# Patient Record
Sex: FEMALE | Race: WHITE
[De-identification: names, ages, dates, MRNs, and addresses within clinical notes are randomized per-mention and may not be internally consistent; named-entity substitution may affect disease eponyms.]

---

## 2017-06-14 ENCOUNTER — HOSPITAL ENCOUNTER (OUTPATIENT)
Dept: HOSPITAL 62 - RAD | Age: 57
End: 2017-06-14
Attending: INTERNAL MEDICINE
Payer: MEDICARE

## 2017-06-14 DIAGNOSIS — Z01.89: ICD-10-CM

## 2017-06-14 DIAGNOSIS — C50.012: Primary | ICD-10-CM

## 2017-06-14 PROCEDURE — 78472 GATED HEART PLANAR SINGLE: CPT

## 2017-06-14 PROCEDURE — A9560 TC99M LABELED RBC: HCPCS

## 2017-06-14 NOTE — RADIOLOGY REPORT (SQ)
EXAM DESCRIPTION:  NM MUGA REST



COMPLETED DATE/TIME:  6/14/2017 3:36 pm



REASON FOR STUDY:  BREAST CA (C50.012)ENCOUNTER FOR OTHER SPECIFIED SPECIAL EXAMINATIONS (Z01. C50.91
2  MALIGNANT NEOPLASM OF UNSPECIFIED SITE OF LEFT FEMAL Z01.89  ENCOUNTER FOR OTHER SPECIFIED SPECIAL
 EXAMINATIONS



COMPARISON:  None.



RADIONUCLIDE AND DOSE:  25 mCi of technetium 99 tagged red blood cells.

The route of agent administration: Intravenous



TECHNIQUE:  Following administration of the radionuclide, gated images of the heart are obtained in t
hree projections.  Left ventricular functional analysis performed.



LIMITATIONS:  None.



FINDINGS:   LEFT VENTRICULAR FUNCTION:

EJECTION FRACTION: 72%.

END-DIASTOLIC VOLUME: 52 mL.

END-SYSTOLIC VOLUME: 16 mL.

WALL MOTION: No focal wall motion abnormalities.

OTHER: No other significant finding.



IMPRESSION:  NORMAL CARDIAC MUGA STUDY.  NORMAL LEFT VENTRICULAR FUNCTION WITH VALUES AS ABOVE.



TECHNICAL DOCUMENTATION:  JOB ID:  [id number]

 2011 [employer]- All Rights Reserved

## 2017-06-16 ENCOUNTER — HOSPITAL ENCOUNTER (OUTPATIENT)
Dept: HOSPITAL 62 - RAD | Age: 57
End: 2017-06-16
Attending: INTERNAL MEDICINE
Payer: MEDICARE

## 2017-06-16 DIAGNOSIS — C50.912: Primary | ICD-10-CM

## 2017-06-16 DIAGNOSIS — Z01.89: ICD-10-CM

## 2017-06-16 PROCEDURE — A9561 TC99M OXIDRONATE: HCPCS

## 2017-06-16 PROCEDURE — 78306 BONE IMAGING WHOLE BODY: CPT

## 2017-06-16 NOTE — RADIOLOGY REPORT (SQ)
EXAM DESCRIPTION:  NM WHOLE BODY BONE SCAN



COMPLETED DATE/TIME:  6/16/2017 12:57 pm



REASON FOR STUDY:  BREAST CA C50.912  MALIGNANT NEOPLASM OF UNSPECIFIED SITE OF LEFT FEMAL



COMPARISON:  CT angio chest 2/8/2015

CT abdomen pelvis 10/29/2012



RADIONUCLIDE AND DOSE:  20.3 millicuries Tc99m MDP.

The route of agent administration: Intravenous.



ADDITIONAL DRUGS AND DOSES:  None.



TECHNIQUE:  Routine delayed images at 3 hours post radionuclide injection acquired of the bony skelet
on including anterior and posterior whole-body projections and additional focused images as needed.



LIMITATIONS:  None.



FINDINGS:  BONES: There is increased uptake over the right medial malleolus, and along the right and 
left lateral malleoli cava likely related to arthritis.

Mild convex rightward thoracic curvature.  Increased uptake at the left L4-5 facet joint likely from 
arthritis

No increased uptake over the bony structures worrisome for metastatic disease.

KIDNEYS: Symmetric excretion without obstruction.

OTHER: No other significant finding.



IMPRESSION:  Increased uptake at both ankles likely from arthritis.

Increased uptake at the left L4-5 facet joint likely from arthritis

No increased uptake over the bony structures worrisome for metastatic disease.



COMMENT:  PQRS [id number]:  Current bone scan is compared with any available plain radiographs, prior bone
 scans, and CT/MRI.



TECHNICAL DOCUMENTATION:  JOB ID:  [id number]

 2011 [employer]- All Rights Reserved

## 2017-06-20 ENCOUNTER — HOSPITAL ENCOUNTER (OUTPATIENT)
Dept: HOSPITAL 62 - RAD | Age: 57
End: 2017-06-20
Attending: INTERNAL MEDICINE
Payer: MEDICARE

## 2017-06-20 DIAGNOSIS — Z01.89: ICD-10-CM

## 2017-06-20 DIAGNOSIS — C50.912: Primary | ICD-10-CM

## 2017-06-20 PROCEDURE — 74177 CT ABD & PELVIS W/CONTRAST: CPT

## 2017-06-20 PROCEDURE — 71260 CT THORAX DX C+: CPT

## 2017-06-20 PROCEDURE — 82565 ASSAY OF CREATININE: CPT

## 2017-06-20 NOTE — RADIOLOGY REPORT (SQ)
EXAM DESCRIPTION:  CT CHEST WITH; CT ABD/PELVIS WITH IV   ORAL



COMPLETED DATE/TIME:  6/20/2017 8:45 am



REASON FOR STUDY:  BREAST CA (C50.912) C50.912  MALIGNANT NEOPLASM OF UNSPECIFIED SITE OF LEFT FEMAL



COMPARISON:  Whole-body bone scan 6/16/2017

CT angio chest 2/8/2016

CT abdomen pelvis 10/29/2012



CONTRAST TYPE AND DOSE:  contrast/concentration: Isovue 370.00 mg/ml; Total Contrast Delivered: 70.0 
ml; Total Saline Delivered: 66.0 ml



RENAL FUNCTION:  Creatinine 0.9



TECHNIQUE:  CT scan of the chest performed using helical scanning technique with dynamic intravenous 
contrast injection.  Images reviewed with lung, soft tissue and bone windows. Reconstructed coronal a
nd sagittal MPR images reviewed.  All images stored on PACS.

CT scan of the abdomen and pelvis performed with intravenous and with oral contrastusing helical scan
erika technique with dynamic intravenous contrast injection.  Images reviewed with lung, soft tissue a
nd bone windows.  Reconstructed coronal and sagittal MPR images reviewed.  Delayed images for evaluat
ion of the urinary system also acquired and evaluated. All images stored on PACS.

All CT scanners at this facility use dose modulation, iterative reconstruction, and/or weight based d
osing when appropriate to reduce radiation dose to as low as reasonably achievable (ALARA).

[facility]: Dose Right  [facility]: CareDose    [facility]: Dose Right    [facility]: Teradose 4D    [facility]: Smart Technologies



RADIATION DOSE:  Up-to-date CT equipment and radiation dose reduction techniques were employed. CTDIv
ol: 5.3 - 14.9 mGy. DLP: 1299 mGy-cm. .



LIMITATIONS:  None.



FINDINGS:  CHEST:

LUNGS AND PLEURA: No opacities, nodules, masses.  No pneumothorax. No effusions.

HILAR AND MEDIASTINAL STRUCTURES: There are enlarged mediastinal lymph nodes on today's scan which ar
e less prominent or stable as compared to 2/8/2016 CT angio chest as follows:

Precarinal 1.8 x 1.2 cm lymph node axial image 19 (was 2.9 by 1.9 cm on 2/18/2015)

AP window 3 x 1.4 cm conglomeration of lymph nodes axial image 20, unchanged from 2/8/2016

Prevascular 1.4 x 0.8 cm lymph node axial image 19 (was 1.8 x 1.3 cm on 2/8/2016)

HEART AND VASCULAR STRUCTURES: No aneurysm or dissection.  No central pulmonary emboli.  No pericardi
al effusion.

HARDWARE: None.

THYROID AND OTHER SOFT TISSUES: In the left upper outer quadrant, a 2.2 cm spiculated breast mass is 
present on axial image 24.  In the left breast laterally about the 3 o'clock position, a 2nd spiculat
ed mass is present 1.5 cm in size on axial image 28.  These are worrisome for primary breast malignan
cy.  On axial image 18, a 12 x 9 mm lymph node with loss of central hilar fat is present.  This may r
epresent a malignant lymph node.

BONES: No significant finding.

OTHER: No other significant finding.

ABDOMEN AND PELVIS:

LIVER: Normal size. No masses or dilated ducts.

SPLEEN: Normal size. No focal lesions.

PANCREAS: No masses. No significant calcifications. No adjacent inflammation or peripancreatic fluid 
collections. Pancreatic duct not dilated.

GALLBLADDER: No identified stones by CT criteria. No inflammatory changes to suggest cholecystitis.

ADRENAL GLANDS: No significant masses or asymmetry.

RIGHT KIDNEY AND URETER: No solid masses. No significant calcification. No hydronephrosis or hydroure
ter.

LEFT KIDNEY AND URETER: No solid masses. No significant calcification. No hydronephrosis or hydrouret
er.

AORTA AND VESSELS: No aneurysm. No dissection. Renal arteries, SMA, celiac without stenosis.

RETROPERITONEUM: No retroperitoneal adenopathy, hemorrhage or masses.

BOWEL AND PERITONEAL CAVITY: No masses or inflammatory changes. No free fluid or peritoneal masses.

APPENDIX: Normal.

ABDOMINAL WALL: No masses. No hernias.

BONES: Chronic T12 compression deformity with greater than 50% loss of height and mild retropulsion o
f the posterior superior corner of T12.  This similar compared to CT abdomen pelvis from 2012

PELVIS: No other significant finding.  Normal size female pelvic organs.  No free pelvic fluid.



IMPRESSION:  Left breast lesions as above

No CT evidence of metastatic disease to the chest abdomen or pelvis

Chronic L1 compression deformity

Mediastinal adenopathy improved as compared to CT chest 2/8/2016 when the patient had extensive alveo
lar and interstitial infiltrates



TECHNICAL DOCUMENTATION:  JOB ID:  [id number]

Quality ID # 436: Final reports with documentation of one or more dose reduction techniques (e.g., Au
tomated exposure control, adjustment of the mA and/or kV according to patient size, use of iterative 
reconstruction technique)

 2011 [employer]- All Rights Reserved

## 2017-06-20 NOTE — RADIOLOGY REPORT (SQ)
EXAM DESCRIPTION:  CT CHEST WITH; CT ABD/PELVIS WITH IV   ORAL



COMPLETED DATE/TIME:  6/20/2017 8:45 am



REASON FOR STUDY:  BREAST CA (C50.912) C50.912  MALIGNANT NEOPLASM OF UNSPECIFIED SITE OF LEFT FEMAL



COMPARISON:  Whole-body bone scan 6/16/2017

CT angio chest 2/8/2016

CT abdomen pelvis 10/29/2012



CONTRAST TYPE AND DOSE:  contrast/concentration: Isovue 370.00 mg/ml; Total Contrast Delivered: 70.0 
ml; Total Saline Delivered: 66.0 ml



RENAL FUNCTION:  Creatinine 0.9



TECHNIQUE:  CT scan of the chest performed using helical scanning technique with dynamic intravenous 
contrast injection.  Images reviewed with lung, soft tissue and bone windows. Reconstructed coronal a
nd sagittal MPR images reviewed.  All images stored on PACS.

CT scan of the abdomen and pelvis performed with intravenous and with oral contrastusing helical scan
erika technique with dynamic intravenous contrast injection.  Images reviewed with lung, soft tissue a
nd bone windows.  Reconstructed coronal and sagittal MPR images reviewed.  Delayed images for evaluat
ion of the urinary system also acquired and evaluated. All images stored on PACS.

All CT scanners at this facility use dose modulation, iterative reconstruction, and/or weight based d
osing when appropriate to reduce radiation dose to as low as reasonably achievable (ALARA).

[facility]: Dose Right  [facility]: CareDose    [facility]: Dose Right    [facility]: Teradose 4D    [facility]: Smart Technologies



RADIATION DOSE:  Up-to-date CT equipment and radiation dose reduction techniques were employed. CTDIv
ol: 5.3 - 14.9 mGy. DLP: 1299 mGy-cm. .



LIMITATIONS:  None.



FINDINGS:  CHEST:

LUNGS AND PLEURA: No opacities, nodules, masses.  No pneumothorax. No effusions.

HILAR AND MEDIASTINAL STRUCTURES: There are enlarged mediastinal lymph nodes on today's scan which ar
e less prominent or stable as compared to 2/8/2016 CT angio chest as follows:

Precarinal 1.8 x 1.2 cm lymph node axial image 19 (was 2.9 by 1.9 cm on 2/18/2015)

AP window 3 x 1.4 cm conglomeration of lymph nodes axial image 20, unchanged from 2/8/2016

Prevascular 1.4 x 0.8 cm lymph node axial image 19 (was 1.8 x 1.3 cm on 2/8/2016)

HEART AND VASCULAR STRUCTURES: No aneurysm or dissection.  No central pulmonary emboli.  No pericardi
al effusion.

HARDWARE: None.

THYROID AND OTHER SOFT TISSUES: In the left upper outer quadrant, a 2.2 cm spiculated breast mass is 
present on axial image 24.  In the left breast laterally about the 3 o'clock position, a 2nd spiculat
ed mass is present 1.5 cm in size on axial image 28.  These are worrisome for primary breast malignan
cy.  On axial image 18, a 12 x 9 mm lymph node with loss of central hilar fat is present.  This may r
epresent a malignant lymph node.

BONES: No significant finding.

OTHER: No other significant finding.

ABDOMEN AND PELVIS:

LIVER: Normal size. No masses or dilated ducts.

SPLEEN: Normal size. No focal lesions.

PANCREAS: No masses. No significant calcifications. No adjacent inflammation or peripancreatic fluid 
collections. Pancreatic duct not dilated.

GALLBLADDER: No identified stones by CT criteria. No inflammatory changes to suggest cholecystitis.

ADRENAL GLANDS: No significant masses or asymmetry.

RIGHT KIDNEY AND URETER: No solid masses. No significant calcification. No hydronephrosis or hydroure
ter.

LEFT KIDNEY AND URETER: No solid masses. No significant calcification. No hydronephrosis or hydrouret
er.

AORTA AND VESSELS: No aneurysm. No dissection. Renal arteries, SMA, celiac without stenosis.

RETROPERITONEUM: No retroperitoneal adenopathy, hemorrhage or masses.

BOWEL AND PERITONEAL CAVITY: No masses or inflammatory changes. No free fluid or peritoneal masses.

APPENDIX: Normal.

ABDOMINAL WALL: No masses. No hernias.

BONES: Chronic T12 compression deformity with greater than 50% loss of height and mild retropulsion o
f the posterior superior corner of T12.  This similar compared to CT abdomen pelvis from 2012

PELVIS: No other significant finding.  Normal size female pelvic organs.  No free pelvic fluid.



IMPRESSION:  Left breast lesions as above

No CT evidence of metastatic disease to the chest abdomen or pelvis

Chronic L1 compression deformity

Mediastinal adenopathy improved as compared to CT chest 2/8/2016 when the patient had extensive alveo
lar and interstitial infiltrates



TECHNICAL DOCUMENTATION:  JOB ID:  [id number]

Quality ID # 436: Final reports with documentation of one or more dose reduction techniques (e.g., Au
tomated exposure control, adjustment of the mA and/or kV according to patient size, use of iterative 
reconstruction technique)

 2011 [employer]- All Rights Reserved

## 2017-06-22 ENCOUNTER — HOSPITAL ENCOUNTER (OUTPATIENT)
Dept: HOSPITAL 62 - ASU | Age: 57
Discharge: HOME | End: 2017-06-22
Attending: SURGERY
Payer: MEDICARE

## 2017-06-22 VITALS — DIASTOLIC BLOOD PRESSURE: 57 MMHG | SYSTOLIC BLOOD PRESSURE: 105 MMHG

## 2017-06-22 DIAGNOSIS — Z79.1: ICD-10-CM

## 2017-06-22 DIAGNOSIS — M19.90: ICD-10-CM

## 2017-06-22 DIAGNOSIS — F41.9: ICD-10-CM

## 2017-06-22 DIAGNOSIS — Z85.6: ICD-10-CM

## 2017-06-22 DIAGNOSIS — C50.912: Primary | ICD-10-CM

## 2017-06-22 DIAGNOSIS — I48.91: ICD-10-CM

## 2017-06-22 DIAGNOSIS — Z87.891: ICD-10-CM

## 2017-06-22 DIAGNOSIS — Z79.899: ICD-10-CM

## 2017-06-22 LAB
ERYTHROCYTE [DISTWIDTH] IN BLOOD BY AUTOMATED COUNT: 16.3 % (ref 11.5–14)
HCT VFR BLD CALC: 42.6 % (ref 36–47)
HGB BLD-MCNC: 13.7 G/DL (ref 12–15.5)
HGB HCT DIFFERENCE: -1.5
MCH RBC QN AUTO: 27.6 PG (ref 27–33.4)
MCHC RBC AUTO-ENTMCNC: 32.3 G/DL (ref 32–36)
MCV RBC AUTO: 86 FL (ref 80–97)
RBC # BLD AUTO: 4.98 10^6/UL (ref 3.72–5.28)
WBC # BLD AUTO: 9.1 10^3/UL (ref 4–10.5)

## 2017-06-22 PROCEDURE — 36561 INSERT TUNNELED CV CATH: CPT

## 2017-06-22 PROCEDURE — 05HM33Z INSERTION OF INFUSION DEVICE INTO RIGHT INTERNAL JUGULAR VEIN, PERCUTANEOUS APPROACH: ICD-10-PCS | Performed by: SURGERY

## 2017-06-22 PROCEDURE — 77001 FLUOROGUIDE FOR VEIN DEVICE: CPT

## 2017-06-22 PROCEDURE — 76937 US GUIDE VASCULAR ACCESS: CPT

## 2017-06-22 PROCEDURE — 85027 COMPLETE CBC AUTOMATED: CPT

## 2017-06-22 PROCEDURE — C1788 PORT, INDWELLING, IMP: HCPCS

## 2017-06-22 PROCEDURE — 36415 COLL VENOUS BLD VENIPUNCTURE: CPT

## 2017-06-22 PROCEDURE — 71010: CPT

## 2017-06-22 NOTE — OPERATIVE REPORT E
Operative Report



NAME: ROBERTA MASON

MRN:  [id number]          : 1960 AGE:  57Y

DATE OF SURGERY:                         ROOM:



PREOPERATIVE DIAGNOSIS:

LOCALLY ADVANCED LEFT BREAST CARCINOMA.



POSTOPERATIVE DIAGNOSIS:

LOCALLY ADVANCED LEFT BREAST CARCINOMA.



OPERATION:

1.  Focused ultrasound of the right neck.

2.  Ultrasound-directed insertion of single lumen Port-A-Cath, right

subclavian position.

3.  Interpretation of intraoperative fluoroscopy.

4.  Limited right superior vena cavogram.



SURGEON:

SAÚL WEEMS M.D.



ANESTHESIA:

IV sedation.



COMPLICATIONS:

NONE.



ESTIMATED BLOOD LOSS:

Minimal.



DRAINS:

None.



PROCEDURE:

The patient was taken to the Cardiac Catheterization Lab where IV sedation

was induced.  The arm was placed in the supine position.  The right neck

and chest were prepped and draped in sterile fashion.



Surgical planning and surgical timeout were conducted.



The right internal jugular vein was scanned with the variable

frequency transducer.



Findings were significant for a patent, compressible right internal

jugular vein.



The skin was anesthetized with 1% lidocaine with epinephrine.  Using the

micro needle and wire, the right internal jugular vein was cannulated.



A suitable site for placement of the port was chosen in the right

subclavian position.  The skin was anesthetized with 1% lidocaine with

epinephrine.  A 3-cm incision was made in the subcostal position parallel

to the right clavicle.  A pocket was developed large enough to accommodate

a dual chamber port.



The catheter was trimmed to the appropriate length, tunneled between the 2

incisions, attached to the port and plastic ring.  The port was tucked

into the right subclavian pocket.



The micro wire was transferred over to a 0.035 guidewire using the micro

introducer sheath.  Under fluoroscopic guidance, the dilator introducer

sheath, 9-Yemeni, was threaded over the wire, wire and dilator removed,

catheter threaded into the strip away sheath and the strip away sheath

removed uneventfully.  Under fluoroscopic guidance, the catheter was in

the superior vena cava.  There was no kinking of the catheter at the neck.



We shot full-strength contrast, 5 mL.  Exposure time 0.3 minutes with a

2.52 microgray exposure.  This revealed no evidence of extravasation and a

patent SVC, right atrium.



The wounds were closed with 3-0 Vicryl, Benzoin and Steri-Strips.  A

portable upper chest x-ray was obtained.



DICTATING PHYSICIAN:  SAÚL WEEMS M.D.





[id number]                  DT: 2017    1133

PHY#: [id number]            DD: 2017    1133

ID:   [id number]           JOB#: [id number]       ACCT: [id number]



cc:SAÚL WEEMS M.D.

>







[facility]

## 2017-06-22 NOTE — RADIOLOGY REPORT (SQ)
EXAM DESCRIPTION:  PORTACATH INSERTION; GUIDANCE FLUOROSCOPIC



COMPLETED DATE/TIME:  6/22/2017 3:09 pm



REASON FOR STUDY:  C50.912 C50.912  MALIGNANT NEOPLASM OF UNSPECIFIED SITE OF LEFT FEMAL Z79.899  OTH
ER LONG TERM (CURRENT) DRUG THERAPY



COMPARISON:  CT chest 6/20/2017



FLUOROSCOPY TIME:  0.3 minutes

3 C-arm images saved to PACS.



TECHNIQUE:  Intra-operative images acquired during surgical procedure to evaluate progress.

NUMBER OF IMAGES: 3 C-arm images



LIMITATIONS:  None.



FINDINGS:  Intra procedural imaging and fluoroscopy during central venous catheter placement by Dr. MARK beyer.  Please see the op report for further details



IMPRESSION:  Intraprocedural imaging and fluoro



COMMENT:  Quality :  Final reports for procedures using fluoroscopy that document radiation exp
osure indices, or exposure time and number of fluorographic images (if radiation exposure indices are
 not available)

Please consult full operative report of the attending physician for description of the procedure.



TECHNICAL DOCUMENTATION:  JOB ID:  [id number]

 2011 [employer]- All Rights Reserved

## 2017-06-22 NOTE — RADIOLOGY REPORT (SQ)
EXAM DESCRIPTION:  PORTACATH INSERTION; GUIDANCE FLUOROSCOPIC



COMPLETED DATE/TIME:  6/22/2017 3:09 pm



REASON FOR STUDY:  C50.912 C50.912  MALIGNANT NEOPLASM OF UNSPECIFIED SITE OF LEFT FEMAL Z79.899  OTH
ER LONG TERM (CURRENT) DRUG THERAPY



COMPARISON:  CT chest 6/20/2017



FLUOROSCOPY TIME:  0.3 minutes

3 C-arm images saved to PACS.



TECHNIQUE:  Intra-operative images acquired during surgical procedure to evaluate progress.

NUMBER OF IMAGES: 3 C-arm images



LIMITATIONS:  None.



FINDINGS:  Intra procedural imaging and fluoroscopy during central venous catheter placement by Dr. MARK beyer.  Please see the op report for further details



IMPRESSION:  Intraprocedural imaging and fluoro



COMMENT:  Quality :  Final reports for procedures using fluoroscopy that document radiation exp
osure indices, or exposure time and number of fluorographic images (if radiation exposure indices are
 not available)

Please consult full operative report of the attending physician for description of the procedure.



TECHNICAL DOCUMENTATION:  JOB ID:  [id number]

 2011 [employer]- All Rights Reserved

## 2017-06-22 NOTE — RADIOLOGY REPORT (SQ)
EXAM DESCRIPTION:  CHEST SINGLE VIEW



COMPLETED DATE/TIME:  6/22/2017 12:12 pm



REASON FOR STUDY:  s/p port insertion



COMPARISON:  None.



EXAM PARAMETERS:  NUMBER OF VIEWS: One view.

TECHNIQUE: Single frontal radiographic view of the chest acquired.

RADIATION DOSE: NA

LIMITATIONS: None.



FINDINGS:  LUNGS AND PLEURA: No opacities, masses or pneumothorax. No pleural effusion.

MEDIASTINUM AND HILAR STRUCTURES: No masses.  Contour normal.

HEART AND VASCULAR STRUCTURES: Heart normal in size.  Normal vasculature.

BONES: No acute findings.

HARDWARE: An injection port is present on the right.  The tip of the catheter is in the superior vena
 cava.

OTHER: No other significant finding.



IMPRESSION:  Catheter placement as described.



TECHNICAL DOCUMENTATION:  JOB ID:  [id number]

## 2017-11-30 LAB
ANION GAP SERPL CALC-SCNC: 13 MMOL/L (ref 5–19)
BUN SERPL-MCNC: 11 MG/DL (ref 7–20)
CALCIUM: 9 MG/DL (ref 8.4–10.2)
CHLORIDE SERPL-SCNC: 103 MMOL/L (ref 98–107)
CO2 SERPL-SCNC: 27 MMOL/L (ref 22–30)
CREAT SERPL-MCNC: 0.67 MG/DL (ref 0.52–1.25)
ERYTHROCYTE [DISTWIDTH] IN BLOOD BY AUTOMATED COUNT: 16.4 % (ref 11.5–14)
GLUCOSE SERPL-MCNC: 76 MG/DL (ref 75–110)
HCT VFR BLD CALC: 40.6 % (ref 36–47)
HGB BLD-MCNC: 13.5 G/DL (ref 12–15.5)
HGB HCT DIFFERENCE: -0.1
MCH RBC QN AUTO: 31.1 PG (ref 27–33.4)
MCHC RBC AUTO-ENTMCNC: 33.4 G/DL (ref 32–36)
MCV RBC AUTO: 93 FL (ref 80–97)
POTASSIUM SERPL-SCNC: 4.1 MMOL/L (ref 3.6–5)
RBC # BLD AUTO: 4.35 10^6/UL (ref 3.72–5.28)
SODIUM SERPL-SCNC: 142.8 MMOL/L (ref 137–145)
WBC # BLD AUTO: 6.7 10^3/UL (ref 4–10.5)

## 2017-12-01 ENCOUNTER — HOSPITAL ENCOUNTER (OUTPATIENT)
Dept: HOSPITAL 62 - OROUT | Age: 57
Discharge: HOME | End: 2017-12-01
Attending: SURGERY
Payer: MEDICARE

## 2017-12-01 VITALS — SYSTOLIC BLOOD PRESSURE: 123 MMHG | DIASTOLIC BLOOD PRESSURE: 55 MMHG

## 2017-12-01 DIAGNOSIS — Z79.899: ICD-10-CM

## 2017-12-01 DIAGNOSIS — Z79.82: ICD-10-CM

## 2017-12-01 DIAGNOSIS — C50.912: Primary | ICD-10-CM

## 2017-12-01 DIAGNOSIS — C77.3: ICD-10-CM

## 2017-12-01 DIAGNOSIS — I48.91: ICD-10-CM

## 2017-12-01 DIAGNOSIS — Z87.891: ICD-10-CM

## 2017-12-01 DIAGNOSIS — Z79.1: ICD-10-CM

## 2017-12-01 DIAGNOSIS — M19.90: ICD-10-CM

## 2017-12-01 PROCEDURE — 19307 MAST MOD RAD: CPT

## 2017-12-01 PROCEDURE — 36415 COLL VENOUS BLD VENIPUNCTURE: CPT

## 2017-12-01 PROCEDURE — 93010 ELECTROCARDIOGRAM REPORT: CPT

## 2017-12-01 PROCEDURE — 88309 TISSUE EXAM BY PATHOLOGIST: CPT

## 2017-12-01 PROCEDURE — 80048 BASIC METABOLIC PNL TOTAL CA: CPT

## 2017-12-01 PROCEDURE — 71010: CPT

## 2017-12-01 PROCEDURE — 0HTU0ZZ RESECTION OF LEFT BREAST, OPEN APPROACH: ICD-10-PCS | Performed by: SURGERY

## 2017-12-01 PROCEDURE — 88342 IMHCHEM/IMCYTCHM 1ST ANTB: CPT

## 2017-12-01 PROCEDURE — 85027 COMPLETE CBC AUTOMATED: CPT

## 2017-12-01 PROCEDURE — 07T60ZZ RESECTION OF LEFT AXILLARY LYMPHATIC, OPEN APPROACH: ICD-10-PCS | Performed by: SURGERY

## 2017-12-01 PROCEDURE — 93005 ELECTROCARDIOGRAM TRACING: CPT

## 2017-12-01 NOTE — PDOC DISCHARGE SUMMARY
Discharge Summary (SDC)





- Discharge


Final Diagnosis: 





Breast carcinoma with axillary metastases status post neoadjuvant chemotherapy


Date of Surgery: 12/01/17


Discharge Date: 12/01/17


Condition: Good


Forms:  [facility] Anesthesia D/C Instruction, Discharge POC-Surgical Service


Treatment or Instructions: 


Patient is to be educated on drain output recording and emptying.  She may 

shower in 48 hours; leave mastectomy incision open, and replace gauze around 

drain sites.





Ensure patient has the pink postmastectomy packet.





We have provided her with a prescription for Percocet as needed pain.  Take a 

stool softener daily.





Patient follow-up with Dr. Chatman 1 week [facility]


Referrals: 


SAÚL CHATMAN MD [ACTIVE STAFF] - 12/05/17 12:15 pm


Discharge Activity: Activity As Tolerated, Other - Postmastectomy left upper 

extremity instructions


Home Care Assistance: None Needed


Report the Following to Your Physician Immediately: Shortness of Breath, Nausea

, Increase in Pain, Fever over 101 Degrees, Unusual Bleeding

## 2017-12-01 NOTE — RADIOLOGY REPORT (SQ)
EXAM DESCRIPTION:  CHEST SINGLE VIEW



COMPLETED DATE/TIME:  12/1/2017 7:18 am



REASON FOR STUDY:  pre-operative



COMPARISON:  Chest x-ray 6/22/2017.



EXAM PARAMETERS:  NUMBER OF VIEWS: One view.

TECHNIQUE: Single frontal radiographic view of the chest acquired.

RADIATION DOSE: NA

LIMITATIONS: None.



FINDINGS:  LUNGS AND PLEURA: Mild right basilar atelectasis.  No sizable pleural effusion or pneumoth
orax.

MEDIASTINUM AND HILAR STRUCTURES: No masses.  Contour normal.

HEART AND VASCULAR STRUCTURES: Heart normal in size.  Normal vasculature.

BONES: No acute findings.

HARDWARE: Right-sided Port-A-Cath with the tip overlying the region of the SVC.



IMPRESSION:  Mild right basilar atelectasis.



TECHNICAL DOCUMENTATION:  JOB ID:  [id number]

[id number]

 2011 [employer]- All Rights Reserved

## 2017-12-01 NOTE — OPERATIVE REPORT
Operative Report


DATE OF SURGERY: 12/01/17


PREOPERATIVE DIAGNOSIS: Invasive left breast carcinoma with left axillary 

metastases, status post neoadjuvant chemotherapy


POSTOPERATIVE DIAGNOSIS: Same


OPERATION: Left modified radical mastectomy with drain placement 2


SURGEON: SAÚL CHATMAN


ANESTHESIA: GA


TISSUE REMOVED OR ALTERED: Left breast and axillary contents


COMPLICATIONS: 





None


ESTIMATED BLOOD LOSS: 75 cc


INTRAOPERATIVE FINDINGS: See below


PROCEDURE: 





The patient was seen in the preop holding area with the left breast was marked 

by Dr. Chatman.  She was then taken to the main operating room where general 

anesthesia was induced.  Left arm was abducted, left breast and left axilla 

prepped and draped in sterile fashion.





Markings were made on the skin for a planned incisional left mastectomy.  

Surgical plan surgical timeout were conducted.  An ellipse was made in the skin 

with a #10 blade encompassing the central portion of the left breast.  Superior 

and inferior skin flaps of appropriate thickness were developed.  We took the 

flap superiorly to the subclavicular area, and the inferior flap to the 

serratus anterior muscle.  The left breast was then taken off of the chest wall 

including the pectoralis major muscle, and serratus anterior muscle.





We now establish the Bookwalter retractor system, retracted our lateral and 

superior flaps, and the tail of Kinsey of the left breast offer the overlying 

skin.  We now began exposure to the left axilla.  We continued the dissection 

in a circumferential fashion, mobilizing all fatty tissue inferiorly from the 

pectoralis minor muscle down to the lateral chest wall medially, the latissimus 

dorsi muscle inferiorly and laterally, and the axillary vein superiorly.  There 

is no evidence of gross visible tumor.  However there was evidence of 

neoadjuvant fibrosis throughout the dissection particularly towards the 

dissection of the vessels off of the axillary vein.  We swept the axillary 

contents from underneath the pectoralis minor muscle, off of the anterior and 

inferior surface of the axillary vein.  Dominant superficial ranch vein coming 

off of the axillary vein sacrificed in the specimen.  We did not definitively 

identify the intercostal brachial nerve.  The long thoracic nerve was 

identified and spared throughout the dissection.  Axillary  contents swept off 

of the lateral chest wall without difficulty.





The major areas of fibrosis involved the posterior component of the axillary 

fat along the latissimus dorsi muscle.  In fact again working in a 

circumferential fashion we are able to all of the axillary fat until we had the 

axillary contents tethered solely by either scar tissue or tumor to the 

thoracal dorsal vascular bundle.  Just anterior to the thoracodorsal nerve 

artery and vein.  Point pinching the nerve elicited action of the thoracodorsal 

muscle but thereafter the muscle did not flinch.  I was able to amputate the 

posterior axillary fat off of the neurovascular bundle in a very this fashion, 

oversewing the small vascular attachments to the fatty tissue with 3-0 and 4-0 

chromic suture.  Left behind was some fatty tissue posteriorly, in conjunction 

with the thoracodorsal complex.  Grossly it felt benign.





A few additional pieces of fatty tissue removed high axilla, level 2 region.  

His were submitted with the primary specimen.





At this point we felt the operation was complete.  There was no gross tumor 

identified and no gross adenopathy so I felt further attempts at resection of 

the thin layer of fibrotic fatty tissue was not indicated.  The drains were 

placed in the inferior flap laterally sewed to the skin with 2-0 Prolene suture 

and hooked to bulb drains.  These were 2 large Brenden's.  Was taken out of 

extension, and mastectomy incision closed with 2-0 Vicryl suture running 

continuous and skin approximated with skin glue.  Flaps were viable at the 

conclusion of the operation





Patient tolerated the procedure well, extubated, taken recovery room in stable 

condition.

## 2017-12-07 NOTE — WOMENS IMAGING REPORT
EXAM DESCRIPTION:  BREAST SPECIMEN



COMPLETED DATE/TIME:  12/7/2017 10:01 am



REASON FOR STUDY:  LEFT BREAST CANCER C50.912 C50.912  MALIGNANT NEOPLASM OF UNSPECIFIED SITE OF LEFT
 FEMAL



COMPARISON:  None.



TECHNIQUE:  Specimen radiograph from breast procedure.



LIMITATIONS:  None.



FINDINGS:  Specimen radiograph of pathology specimen submitted to in cassette [email or web address].

Please see procedure note for details and final pathology.



IMPRESSION:  Specimen radiograph.



TECHNICAL DOCUMENTATION:  JOB ID:  [id number]

## 2017-12-15 ENCOUNTER — HOSPITAL ENCOUNTER (OUTPATIENT)
Dept: HOSPITAL 62 - WI | Age: 57
End: 2017-12-15
Attending: INTERNAL MEDICINE
Payer: MEDICARE

## 2017-12-15 DIAGNOSIS — M85.88: Primary | ICD-10-CM

## 2017-12-15 DIAGNOSIS — Z79.83: ICD-10-CM

## 2017-12-15 PROCEDURE — 77080 DXA BONE DENSITY AXIAL: CPT

## 2017-12-15 NOTE — WOMENS IMAGING REPORT
EXAM DESCRIPTION:  BONE DENSITY HIP/SPINE



COMPLETED DATE/TIME:  12/15/2017 9:45 am



REASON FOR STUDY:  LONG TERM CURRENT HIGH RISK MEDS; V58.69 Z79.83  LONG TERM (CURRENT) USE OF BISPHO
SPHONATES



COMPARISON:   None.



TECHNIQUE:  Dual-Energy X-ray Absorptiometry (DEXA) of the AP Spine and Hip.



LIMITATIONS:  None.



FINDINGS:  LUMBAR SPINE:

The bone mineral density (BMD) measured from L1-L4 in the AP projection correlates with a T-score of 
-0.7, which is normal as defined by the World Health Organization.

HIP:

The bone mineral density (BMD) measured in the left hip correlates with a T-score of -1.5, which is o
steopenia as defined by the World Health Organization.



IMPRESSION:  1. LUMBAR SPINE: NORMAL.

2.  HIP: OSTEOPENIA.



COMMENT:  The World Health Organization defines low BMD as follows:

T-score:

Normal:  Greater than -1.0

Osteopenia: Between -1.0 and -2.5

Osteoporosis:  Less than -2.5 without fractures

Established osteoporosis:  Less than -2.5 with fractures

In general, you may wish to consider:

Diagnosis          Treatment                     Follow-up DEXA

Normal BMD      Prevention                    2-3 years

Osteopenia       Prevention/Therapy        1-2 years

Osteoporosis     Therapy                        Yearly



TECHNICAL DOCUMENTATION:  JOB ID:  [id number]

 2011 [employer]- All Rights Reserved

## 2018-07-02 ENCOUNTER — HOSPITAL ENCOUNTER (OUTPATIENT)
Dept: HOSPITAL 62 - RAD | Age: 58
End: 2018-07-02
Attending: INTERNAL MEDICINE
Payer: MEDICARE

## 2018-07-02 DIAGNOSIS — C50.912: Primary | ICD-10-CM

## 2018-07-02 PROCEDURE — 78306 BONE IMAGING WHOLE BODY: CPT

## 2018-07-02 PROCEDURE — A9561 TC99M OXIDRONATE: HCPCS

## 2018-07-02 NOTE — RADIOLOGY REPORT (SQ)
EXAM DESCRIPTION:  NM WHOLE BODY BONE SCAN



COMPLETED DATE/TIME:  7/2/2018 11:47 am



REASON FOR STUDY:  BREAST CA (C50.912) C50.912  MALIGNANT NEOPLASM OF UNSPECIFIED SITE OF LEFT FEMAL



COMPARISON:  Bone scan 6/16/2017

CT chest 6/20/2017



RADIONUCLIDE AND DOSE:  20.7 millicuries Tc99m MDP.

The route of agent administration: Intravenous.



ADDITIONAL DRUGS AND DOSES:  None.



TECHNIQUE:  Routine delayed images at 3 hours post radionuclide injection acquired of the bony skelet
on including anterior and posterior whole-body projections and additional focused images as needed.



LIMITATIONS:  None.



FINDINGS:  BONES: Mild increased uptake in the mid 3rd of the sternum correlates with old healed ster
nal fracture on CT 6/20/2017.

Increased uptake at the T11-12 facet joints correlates with a chronic greater than 50% T12 compressio
n deformity.

Mild increased uptake at the bilateral shoulders, bilateral L4-5 facet, SI joints, and the bilateral 
ankles likely in areas of arthritis.

No bony activity worrisome for metastatic disease given history of breast cancer.

KIDNEYS: Symmetric excretion without obstruction.

OTHER: No other significant finding.



IMPRESSION:  No bone scan evidence of metastatic breast cancer.



COMMENT:  Quality measure 147:  Current bone scan is compared with any available plain radiographs, p
rior bone scans, and CT/MRI.



TECHNICAL DOCUMENTATION:  JOB ID:  [id number]

 2011 [employer]- All Rights Reserved



Reading location - IP/workstation name: [email or web address]

## 2018-07-05 ENCOUNTER — HOSPITAL ENCOUNTER (OUTPATIENT)
Dept: HOSPITAL 62 - RAD | Age: 58
End: 2018-07-05
Attending: INTERNAL MEDICINE
Payer: MEDICARE

## 2018-07-05 DIAGNOSIS — Z90.12: ICD-10-CM

## 2018-07-05 DIAGNOSIS — C50.912: Primary | ICD-10-CM

## 2018-07-05 PROCEDURE — 74177 CT ABD & PELVIS W/CONTRAST: CPT

## 2018-07-05 PROCEDURE — 71260 CT THORAX DX C+: CPT

## 2018-07-05 NOTE — RADIOLOGY REPORT (SQ)
EXAM DESCRIPTION:  CT CHEST WITH; CT ABD/PELVIS WITH IV   ORAL



COMPLETED DATE/TIME:  7/5/2018 8:16 am



REASON FOR STUDY:  BREAST CA (C50.912) C50.912  MALIGNANT NEOPLASM OF UNSPECIFIED SITE OF LEFT FEMAL



COMPARISON:  CT abdomen pelvis 10/29/2012

CT angio chest 2/8/2016

CT chest abdomen pelvis 6/20/2017

Bone scan 7/2/2018



CONTRAST TYPE AND DOSE:  contrast/concentration: Isovue 370.00 mg/ml; Total Contrast Delivered: 61.0 
ml; Total Saline Delivered: 65.0 ml



RENAL FUNCTION:  Creatinine 0.9



TECHNIQUE:  CT scan of the chest performed using helical scanning technique with dynamic intravenous 
contrast injection.  Images reviewed with lung, soft tissue and bone windows. Reconstructed coronal a
nd sagittal MPR images reviewed.  All images stored on PACS.

CT scan of the abdomen and pelvis performed with intravenous and with oral contrastusing helical scan
erika technique with dynamic intravenous contrast injection.  Images reviewed with lung, soft tissue a
nd bone windows.  Reconstructed coronal and sagittal MPR images reviewed.  Delayed images for evaluat
ion of the urinary system also acquired and evaluated. All images stored on PACS.

All CT scanners at this facility use dose modulation, iterative reconstruction, and/or weight based d
osing when appropriate to reduce radiation dose to as low as reasonably achievable (ALARA).

[facility]: Dose Right  [facility]: CareDose    [facility]: Dose Right    [facility]: Teradose 4D    [facility]: [employer]



RADIATION DOSE:  CT Rad equipment meets quality standard of care and radiation dose reduction techniq
ues were employed. CTDIvol: 4.6 - 4.6 mGy. DLP: 638 mGy-cm. .



LIMITATIONS:  None.



FINDINGS:  CHEST:

LUNGS AND PLEURA: Minimal bandlike atelectasis or scarring is seen in the medial right middle lobe, m
edial lingula, and in both lung bases.  No fluffy alveolar infiltrates worrisome for pulmonary edema 
or pneumonia.  No worrisome pulmonary nodules.

No pleural effusion.  No pneumothorax.

HILAR AND MEDIASTINAL STRUCTURES: Decrease in size and number of mediastinal lymph nodes since prior 
CT chest 6/20/2017.  Less than 7 mm short axis AP window lymph nodes are seen today.

HEART AND VASCULAR STRUCTURES: No aneurysm or dissection.  No central pulmonary emboli.  No pericardi
al effusion.

HARDWARE: Right-sided permanent central line tip superior vena cava

THYROID AND OTHER SOFT TISSUES: Left mastectomy.  Left axillary surgical clips

BONES: Chronic greater than 50% T12 compression deformity.  Old sternal fracture.

OTHER: No other significant finding.

ABDOMEN AND PELVIS:

LIVER: Normal size. No masses.  No dilated ducts.

SPLEEN: Normal size. No focal lesions.

PANCREAS: No masses. No significant calcifications. No adjacent inflammation or peripancreatic fluid 
collections. Pancreatic duct not dilated.

GALLBLADDER: No identified stones by CT criteria. No inflammatory changes to suggest cholecystitis.

ADRENAL GLANDS: No significant masses or asymmetry.

RIGHT KIDNEY AND URETER: No solid masses. No significant calcification. No hydronephrosis or hydroure
ter.

LEFT KIDNEY AND URETER: No solid masses. No significant calcification. No hydronephrosis or hydrouret
er.

AORTA AND VESSELS: No aneurysm. No dissection. Renal arteries, SMA, celiac without stenosis.

RETROPERITONEUM: No retroperitoneal adenopathy, hemorrhage or masses.

BOWEL AND PERITONEAL CAVITY: Patient drank oral contrast.  No CT evidence of bowel obstruction.  No f
ree intraperitoneal air or fluid.

APPENDIX: Normal.

ABDOMINAL WALL: No masses. No hernias.

PELVIS: No mass or free fluid.  Normal bladder.  Normal size female pelvic organs

BONES: No significant or acute findings.

OTHER: No other significant finding.



IMPRESSION:  Old left mastectomy with axillary clips.

No CT evidence of metastatic breast cancer to the chest abdomen or pelvis



TECHNICAL DOCUMENTATION:  JOB ID:  [id number]

Quality ID # 436: Final reports with documentation of one or more dose reduction techniques (e.g., Au
tomated exposure control, adjustment of the mA and/or kV according to patient size, use of iterative 
reconstruction technique)

 2011 [employer]- All Rights Reserved



Reading location - IP/workstation name: [facility]

## 2018-07-05 NOTE — RADIOLOGY REPORT (SQ)
EXAM DESCRIPTION:  CT CHEST WITH; CT ABD/PELVIS WITH IV   ORAL



COMPLETED DATE/TIME:  7/5/2018 8:16 am



REASON FOR STUDY:  BREAST CA (C50.912) C50.912  MALIGNANT NEOPLASM OF UNSPECIFIED SITE OF LEFT FEMAL



COMPARISON:  CT abdomen pelvis 10/29/2012

CT angio chest 2/8/2016

CT chest abdomen pelvis 6/20/2017

Bone scan 7/2/2018



CONTRAST TYPE AND DOSE:  contrast/concentration: Isovue 370.00 mg/ml; Total Contrast Delivered: 61.0 
ml; Total Saline Delivered: 65.0 ml



RENAL FUNCTION:  Creatinine 0.9



TECHNIQUE:  CT scan of the chest performed using helical scanning technique with dynamic intravenous 
contrast injection.  Images reviewed with lung, soft tissue and bone windows. Reconstructed coronal a
nd sagittal MPR images reviewed.  All images stored on PACS.

CT scan of the abdomen and pelvis performed with intravenous and with oral contrastusing helical scan
erika technique with dynamic intravenous contrast injection.  Images reviewed with lung, soft tissue a
nd bone windows.  Reconstructed coronal and sagittal MPR images reviewed.  Delayed images for evaluat
ion of the urinary system also acquired and evaluated. All images stored on PACS.

All CT scanners at this facility use dose modulation, iterative reconstruction, and/or weight based d
osing when appropriate to reduce radiation dose to as low as reasonably achievable (ALARA).

[facility]: Dose Right  [facility]: CareDose    [facility]: Dose Right    [facility]: Teradose 4D    [facility]: [employer]



RADIATION DOSE:  CT Rad equipment meets quality standard of care and radiation dose reduction techniq
ues were employed. CTDIvol: 4.6 - 4.6 mGy. DLP: 638 mGy-cm. .



LIMITATIONS:  None.



FINDINGS:  CHEST:

LUNGS AND PLEURA: Minimal bandlike atelectasis or scarring is seen in the medial right middle lobe, m
edial lingula, and in both lung bases.  No fluffy alveolar infiltrates worrisome for pulmonary edema 
or pneumonia.  No worrisome pulmonary nodules.

No pleural effusion.  No pneumothorax.

HILAR AND MEDIASTINAL STRUCTURES: Decrease in size and number of mediastinal lymph nodes since prior 
CT chest 6/20/2017.  Less than 7 mm short axis AP window lymph nodes are seen today.

HEART AND VASCULAR STRUCTURES: No aneurysm or dissection.  No central pulmonary emboli.  No pericardi
al effusion.

HARDWARE: Right-sided permanent central line tip superior vena cava

THYROID AND OTHER SOFT TISSUES: Left mastectomy.  Left axillary surgical clips

BONES: Chronic greater than 50% T12 compression deformity.  Old sternal fracture.

OTHER: No other significant finding.

ABDOMEN AND PELVIS:

LIVER: Normal size. No masses.  No dilated ducts.

SPLEEN: Normal size. No focal lesions.

PANCREAS: No masses. No significant calcifications. No adjacent inflammation or peripancreatic fluid 
collections. Pancreatic duct not dilated.

GALLBLADDER: No identified stones by CT criteria. No inflammatory changes to suggest cholecystitis.

ADRENAL GLANDS: No significant masses or asymmetry.

RIGHT KIDNEY AND URETER: No solid masses. No significant calcification. No hydronephrosis or hydroure
ter.

LEFT KIDNEY AND URETER: No solid masses. No significant calcification. No hydronephrosis or hydrouret
er.

AORTA AND VESSELS: No aneurysm. No dissection. Renal arteries, SMA, celiac without stenosis.

RETROPERITONEUM: No retroperitoneal adenopathy, hemorrhage or masses.

BOWEL AND PERITONEAL CAVITY: Patient drank oral contrast.  No CT evidence of bowel obstruction.  No f
ree intraperitoneal air or fluid.

APPENDIX: Normal.

ABDOMINAL WALL: No masses. No hernias.

PELVIS: No mass or free fluid.  Normal bladder.  Normal size female pelvic organs

BONES: No significant or acute findings.

OTHER: No other significant finding.



IMPRESSION:  Old left mastectomy with axillary clips.

No CT evidence of metastatic breast cancer to the chest abdomen or pelvis



TECHNICAL DOCUMENTATION:  JOB ID:  [id number]

Quality ID # 436: Final reports with documentation of one or more dose reduction techniques (e.g., Au
tomated exposure control, adjustment of the mA and/or kV according to patient size, use of iterative 
reconstruction technique)

 2011 [employer]- All Rights Reserved



Reading location - IP/workstation name: [facility]

## 2019-03-04 NOTE — PDOC DISCHARGE SUMMARY
Discharge Summary (SDC)





- Discharge


Final Diagnosis: 


Breast cancer


Date of Surgery: 06/22/17


Discharge Date: 06/22/17


Condition: Good


Treatment or Instructions: 








 [facility]


 [address] [place] [id number]


 Phone: [phone]    Fax:  [phone]


 








 Discharge Instructions: Neck Surgery





1. General Information: 


a.   Do not drive a car or operate machinery for 1-2 weeks or as long as taking 

narcotics for pain.


b.   Do not consume alcohol, tranquilizers, sleeping medications or any non-

prescribed medications for 24 hours unless approved by your doctor or as long 

as taking pain medication.


c.   Do not make important decisions or sign any important papers for the first 

24 hours after surgery.


d.   When discharged home the same day of surgery have a responsible person 

with you for the first night.


2. Activity Restrictions: 2  weeks.


a.   Avoid heavy lifting > 10 lbs, straining, sports, mowing lawn, shoveling 

snow, vacuum cleaning and bending over a lot. Limit bending to taking a shower 

and getting dressed. Sleep   with head elevated (2 pillows).


b.   Walking is important to avoid blood clots in the legs and deep breathing 

can prevent pneumonia. 


c.   It is fine to go up and down steps, ride in a car, and use a stationary 

bike with low resistance. 


3. Treatment: 


a.   The dressing can be removed the day after surgery and to shower then daily 

is fine, but you should not bathe in the tub or go swimming for 2 weeks. The 

paper strips (steri-strips) on the skin will fall off and can get wet with a 

shower, just pat them dry. The sutures dissolve and the strips will be removed 

in the office on your follow up visit if they have not fallen off by then. May 

shower 24 hours after surgery; if your incision was glued you may wash your 

neck and expect glue to fall off in about 2 weeks.


b.   Do not use oils, powders or lotion on your incision until after the first 

postoperative visit. Then you may begin to apply daily to the incision a cream 

of your choice (Vitamin E, cocoa butter, scar creams) to help soften the scar. 


c.   If you are fair skinned it would be wise to use sunscreen on the scar for 

the first 6 months or keep it covered to avoid tanning pigment deposits being 

trapped in the scar creating a dark line instead of a pink scar. 


4. Medications: 


a.   


b.   Stop the narcotic when able since you cannot take and drive and they may 

cause constipation. You may switch to plain Tylenol, Advil or Aleve as you 

transition from the narcotic. Many adults find good pain relief with Ibuprofen 

600-800 mg three times a day with meals to work well to avoid narcotic use. 

High doses of Ibuprofen should only be used for short courses since it can 

cause indigestion, ulcer bleeding in the stomach and harm kidney function. 


c.   You should resume all normal medications unless a change is specified by 

your doctors.


d.   


5. Diet: 


a.   If going home same day of surgery you should begin with clear liquids and 

if do well then advance to a normal diet with foods low in fat and protein. 

Small portion sizes may be wise the first night to lessen risk of vomiting. 


b.   When discharged after a hospital stay you may resume a normal diet. 


6. Notify Physician If:


a.   Worsening of pain or swelling in neck, persistent bleeding at the 

operative site, nausea and vomiting, fever above 101, unable to urinate and 

bladder pressure after 8-12 hours, increased redness, drainage, or foul 

smelling discharge from the incision. 


b.   If you have difficulty breathing or chest pain, call an ambulance and/or 

go to the Emergency Room.


7. Follow Up Care:


a.   Schedule a follow up appointment with your doctor for 2 weeks. In the 

event of any postoperative problems or questions or you may call the office 

during business hours or the On-Call physician evenings and weekends at [facility].   


[facility] [phone] 


[facility] [phone]





8. I understand the instructions for my postoperative care as described above 

and a copy has been given to me.





   _________________________          _________________________          _______

________


   Patient/Significant Other                    Witness                        

                       Date





Discharge Diet: As Tolerated


Discharge Activity: Activity As Tolerated


Home Care Assistance: None Needed


Report the Following to Your Physician Immediately: Shortness of Breath, 

Increase in Pain, Fever over 101 Degrees [Fatigue] : fatigue [Nasal Congestion] : nasal congestion [Cough] : cough [Sputum] : sputum  [Dyspnea] : no dyspnea [Wheezing] : wheezing [Negative] : Sleep Disorder

## 2019-08-12 ENCOUNTER — HOSPITAL ENCOUNTER (OUTPATIENT)
Dept: HOSPITAL 62 - RAD | Age: 59
Discharge: HOME | End: 2019-08-12
Attending: INTERNAL MEDICINE
Payer: MEDICARE

## 2019-08-12 VITALS — SYSTOLIC BLOOD PRESSURE: 104 MMHG | DIASTOLIC BLOOD PRESSURE: 51 MMHG

## 2019-08-12 DIAGNOSIS — C50.912: Primary | ICD-10-CM

## 2019-08-12 DIAGNOSIS — K76.89: ICD-10-CM

## 2019-08-12 LAB
APTT BLD: 29.5 SEC (ref 23.5–35.8)
BUN SERPL-MCNC: 16 MG/DL (ref 7–20)
ERYTHROCYTE [DISTWIDTH] IN BLOOD BY AUTOMATED COUNT: 14.6 % (ref 11.5–14)
HCT VFR BLD CALC: 47.5 % (ref 36–47)
HGB BLD-MCNC: 16.1 G/DL (ref 12–15.5)
INR PPP: 0.95
MCH RBC QN AUTO: 30.4 PG (ref 27–33.4)
MCHC RBC AUTO-ENTMCNC: 33.9 G/DL (ref 32–36)
MCV RBC AUTO: 90 FL (ref 80–97)
PLATELET # BLD: 351 10^3/UL (ref 150–450)
PROTHROMBIN TIME: 12.7 SEC (ref 11.4–15.4)
RBC # BLD AUTO: 5.3 10^6/UL (ref 3.72–5.28)
WBC # BLD AUTO: 9.1 10^3/UL (ref 4–10.5)

## 2019-08-12 PROCEDURE — 77012 CT SCAN FOR NEEDLE BIOPSY: CPT

## 2019-08-12 PROCEDURE — 82565 ASSAY OF CREATININE: CPT

## 2019-08-12 PROCEDURE — 85730 THROMBOPLASTIN TIME PARTIAL: CPT

## 2019-08-12 PROCEDURE — 85027 COMPLETE CBC AUTOMATED: CPT

## 2019-08-12 PROCEDURE — 85610 PROTHROMBIN TIME: CPT

## 2019-08-12 PROCEDURE — 88342 IMHCHEM/IMCYTCHM 1ST ANTB: CPT

## 2019-08-12 PROCEDURE — 84520 ASSAY OF UREA NITROGEN: CPT

## 2019-08-12 PROCEDURE — 47000 NEEDLE BIOPSY OF LIVER PERQ: CPT

## 2019-08-12 PROCEDURE — 88305 TISSUE EXAM BY PATHOLOGIST: CPT

## 2019-08-12 PROCEDURE — 36415 COLL VENOUS BLD VENIPUNCTURE: CPT

## 2019-08-12 PROCEDURE — 88341 IMHCHEM/IMCYTCHM EA ADD ANTB: CPT

## 2019-08-12 NOTE — RADIOLOGY REPORT (SQ)
EXAM DESCRIPTION:  CT BIOPSY LIVER; CT NEEDLE PLACEMENT



COMPLETED DATE/TIME:  8/12/2019 11:17 am; 8/12/2019 11:14 am



REASON FOR STUDY:  OTHER SPECIFIED DISEASES OF LIVER; OTHER SPECIFIED DISEASES OF LIVER, LIVER BIOPSY
 C50.912  MALIGNANT NEOPLASM OF UNSPECIFIED SITE OF LEFT FEMAL K76.89  OTHER SPECIFIED DISEASES OF LI
ANA PAULA



COMPARISON:  None.



TECHNIQUE:  After obtaining informed consent and explaining the risks and benefits of conscious sedat
ion,the patient agreed to the procedure. The patient was brought to the CT suite and was placed supin
e on the CT gurney. The patient was prepped and draped in the usual sterile fashion.  Axial images we
re obtained for targeting of the anterior left lobe. An appropriate access site was selected. IV cons
cious sedation was administered and physician direction by the registered nurse using 1 milligrams of
 Versed and 100 micrograms of fentanyl. Physiologic monitoring was provided before, during, and after
 sedation. The total sedation time was 30 minutes.

Documentation face to face time, the performing proceduralist, spent monitoring the patient: 8 minute
s.

 Noncontrasted CT of the liver was performed to localize an approach for the anterior left lobe liver
 biopsy.  A percutaneous site was marked.  Time out was performed.

 After skin prep and local lidocaine for skin and deep tissue anesthesia, a coaxial biopsy needle sys
tem was used to obtain several cores of tissue from the anterior left lobe of the liver.  These were 
submitted to the lab in formalin.   No immediate postprocedure complications.

Total of 9.0 seconds of CT fluoro was used.

3 series of CT Fluoroscopic images were obtained and saved to PACS.

All CT scanners at this facility use dose modulation, iterative reconstruction, and/or weight based d
osing when appropriate to reduce radiation dose to as low as reasonably achievable (ALARA).

[facility]: Dose Right  [facility]: CareDose    [facility]: Dose Right    [facility]: Teradose 4D    [facility]: [employer]



RADIATION DOSE:  CT Rad equipment meets quality standard of care and radiation dose reduction techniq
ues were employed. CTDIvol: 4.0 - 14.8 mGy. DLP: 740 mGy-cm. mGy.



LIMITATIONS:  None.



FINDINGS:  CT guided liver biopsy as detailed above.



IMPRESSION:   CT GUIDED LEFT LOBE LIVER BIOPSY PERFORMED AS ABOVE.  PATHOLOGY PENDING.  NO IMMEDIATE 
COMPLICATIONS.



COMMENT:  Patient medication list reviewed:Yes- Quality ID# 130:Eligible professional attests to docu
menting in the medical record they obtained, updated, or reviewed the patient's current medications..


Quality :  Final reports for procedures using fluoroscopy that document radiation exposure yovanny
rupa, or exposure time and number of fluorographic images (if radiation exposure indices are not avail
able)



TECHNICAL DOCUMENTATION:  JOB ID:  [id number]

 Quality ID # 436: Final reports with documentation of one or more dose reduction techniques (e.g., A
utomated exposure control, adjustment of the mA and/or kV according to patient size, use of iterative
 reconstruction technique)

 2011 [employer]- All Rights Reserved



Reading location - IP/workstation name: [email or web address]

## 2019-08-12 NOTE — RADIOLOGY REPORT (SQ)
EXAM DESCRIPTION:  CT BIOPSY LIVER; CT NEEDLE PLACEMENT



COMPLETED DATE/TIME:  8/12/2019 11:17 am; 8/12/2019 11:14 am



REASON FOR STUDY:  OTHER SPECIFIED DISEASES OF LIVER; OTHER SPECIFIED DISEASES OF LIVER, LIVER BIOPSY
 C50.912  MALIGNANT NEOPLASM OF UNSPECIFIED SITE OF LEFT FEMAL K76.89  OTHER SPECIFIED DISEASES OF LI
ANA PAULA



COMPARISON:  None.



TECHNIQUE:  After obtaining informed consent and explaining the risks and benefits of conscious sedat
ion,the patient agreed to the procedure. The patient was brought to the CT suite and was placed supin
e on the CT gurney. The patient was prepped and draped in the usual sterile fashion.  Axial images we
re obtained for targeting of the anterior left lobe. An appropriate access site was selected. IV cons
cious sedation was administered and physician direction by the registered nurse using 1 milligrams of
 Versed and 100 micrograms of fentanyl. Physiologic monitoring was provided before, during, and after
 sedation. The total sedation time was 30 minutes.

Documentation face to face time, the performing proceduralist, spent monitoring the patient: 8 minute
s.

 Noncontrasted CT of the liver was performed to localize an approach for the anterior left lobe liver
 biopsy.  A percutaneous site was marked.  Time out was performed.

 After skin prep and local lidocaine for skin and deep tissue anesthesia, a coaxial biopsy needle sys
tem was used to obtain several cores of tissue from the anterior left lobe of the liver.  These were 
submitted to the lab in formalin.   No immediate postprocedure complications.

Total of 9.0 seconds of CT fluoro was used.

3 series of CT Fluoroscopic images were obtained and saved to PACS.

All CT scanners at this facility use dose modulation, iterative reconstruction, and/or weight based d
osing when appropriate to reduce radiation dose to as low as reasonably achievable (ALARA).

[facility]: Dose Right  [facility]: CareDose    [facility]: Dose Right    [facility]: Teradose 4D    [facility]: [employer]



RADIATION DOSE:  CT Rad equipment meets quality standard of care and radiation dose reduction techniq
ues were employed. CTDIvol: 4.0 - 14.8 mGy. DLP: 740 mGy-cm. mGy.



LIMITATIONS:  None.



FINDINGS:  CT guided liver biopsy as detailed above.



IMPRESSION:   CT GUIDED LEFT LOBE LIVER BIOPSY PERFORMED AS ABOVE.  PATHOLOGY PENDING.  NO IMMEDIATE 
COMPLICATIONS.



COMMENT:  Patient medication list reviewed:Yes- Quality ID# 130:Eligible professional attests to docu
menting in the medical record they obtained, updated, or reviewed the patient's current medications..


Quality :  Final reports for procedures using fluoroscopy that document radiation exposure yovanny
rupa, or exposure time and number of fluorographic images (if radiation exposure indices are not avail
able)



TECHNICAL DOCUMENTATION:  JOB ID:  [id number]

 Quality ID # 436: Final reports with documentation of one or more dose reduction techniques (e.g., A
utomated exposure control, adjustment of the mA and/or kV according to patient size, use of iterative
 reconstruction technique)

 2011 [employer]- All Rights Reserved



Reading location - IP/workstation name: [email or web address]

## 2019-08-26 ENCOUNTER — HOSPITAL ENCOUNTER (OUTPATIENT)
Dept: HOSPITAL 62 - OROUT | Age: 59
Discharge: HOME | End: 2019-08-26
Attending: SURGERY
Payer: MEDICARE

## 2019-08-26 VITALS — DIASTOLIC BLOOD PRESSURE: 60 MMHG | SYSTOLIC BLOOD PRESSURE: 123 MMHG

## 2019-08-26 DIAGNOSIS — Z01.818: ICD-10-CM

## 2019-08-26 DIAGNOSIS — Z79.899: ICD-10-CM

## 2019-08-26 DIAGNOSIS — Z79.82: ICD-10-CM

## 2019-08-26 DIAGNOSIS — C50.912: Primary | ICD-10-CM

## 2019-08-26 DIAGNOSIS — M19.90: ICD-10-CM

## 2019-08-26 DIAGNOSIS — C92.11: ICD-10-CM

## 2019-08-26 DIAGNOSIS — I48.91: ICD-10-CM

## 2019-08-26 LAB
ERYTHROCYTE [DISTWIDTH] IN BLOOD BY AUTOMATED COUNT: 14.8 % (ref 11.5–14)
HCT VFR BLD CALC: 47.2 % (ref 36–47)
HGB BLD-MCNC: 15.9 G/DL (ref 12–15.5)
MCH RBC QN AUTO: 30.4 PG (ref 27–33.4)
MCHC RBC AUTO-ENTMCNC: 33.7 G/DL (ref 32–36)
MCV RBC AUTO: 90 FL (ref 80–97)
PLATELET # BLD: 420 10^3/UL (ref 150–450)
RBC # BLD AUTO: 5.25 10^6/UL (ref 3.72–5.28)
WBC # BLD AUTO: 12.4 10^3/UL (ref 4–10.5)

## 2019-08-26 PROCEDURE — 71045 X-RAY EXAM CHEST 1 VIEW: CPT

## 2019-08-26 PROCEDURE — C1788 PORT, INDWELLING, IMP: HCPCS

## 2019-08-26 PROCEDURE — 77001 FLUOROGUIDE FOR VEIN DEVICE: CPT

## 2019-08-26 PROCEDURE — 36561 INSERT TUNNELED CV CATH: CPT

## 2019-08-26 PROCEDURE — C1752 CATH,HEMODIALYSIS,SHORT-TERM: HCPCS

## 2019-08-26 PROCEDURE — 85027 COMPLETE CBC AUTOMATED: CPT

## 2019-08-26 PROCEDURE — 36415 COLL VENOUS BLD VENIPUNCTURE: CPT

## 2019-08-26 NOTE — OPERATIVE REPORT
Operative Report


DATE OF SURGERY: 08/26/19


PREOPERATIVE DIAGNOSIS: Metastatic breast carcinoma


POSTOPERATIVE DIAGNOSIS: Same


OPERATION: 1.  Focused ultrasound of the right neck.  2.  Placement of single 

lumen Yoantp-k-Dltw catheter into the right subclavian position.  3.  

Interpretation of intraoperative fluoroscopy.


SURGEON: SAÚL WEEMS


ANESTHESIA: LMAC


TISSUE REMOVED OR ALTERED: None


COMPLICATIONS: 


None


ESTIMATED BLOOD LOSS: Scant


INTRAOPERATIVE FINDINGS: See below


PROCEDURE: 





Patient was taken the preop holding area to main operating room where LMAC 

anesthesia was induced.  Arms were tucked, neck and chest prepped and draped 

sterile fashion





Surgical plan surgical timeout were conducted.  The patient is status post right

subclavian Lfirkh-u-Nwxq catheter placement.  We initially approached to the 

right neck.  Real-time ultrasonography was used to scan the right neck.  There 

was a diminutive right internal jugular vein.  Skin was anesthetized with 1% 

plain lidocaine.  A small nick was made the skin with 11 blade and attempts were

made to cannulate the right internal jugular vein and thread a wire but were 

unsuccessful.  Therefore the right internal jugular vein approach was aborted





A suitable site for access to the right subclavian vein was identified.  The 

skin was anesthetized 1% plain lidocaine.  A nick was made the skin with 11 

blade, venipuncture needle and 0.030 inch guidewire threaded into the deep 

venous system as confirmed by fluoroscopy





A suitable site for placement of the port was chosen several centimeters below 

the initial stick site.  The skin was anesthetized 1% plain lidocaine, a 3 cm 

incision was made over the previous right subclavian scar.  A port pocket was 

developed with electrocautery large enough to accommodate the single-chamber 

port.  The single-lumen catheter was then trimmed to the appropriate length, 24 

cm, tunneled between the 2 incisions, and attached to the port with the plastic 

ring.  The port was tucked into the right subclavian pocket without looseness.  

Under fluoroscopic guidance, the dilator introducer sheath was threaded over the

conventional guidewire, dilator wire removed, catheter threaded into the strip 

away sheath, sheath removed, leaving catheter in excellent position.  The tip of

the catheter was in the SVC-right atrial junction.  There is no evidence of 

ectopy.  There is no kinking of the catheter at the subclavian site.  A Del Valle 

needle was placed into the chamber, aspirated and flushed with heparinized 

saline.  Wounds closed with 3-0 Vicryl benzoin and Steri-Strips.  Patient 

tolerated the procedure well, taken recovery in stable condition.





Portable chest x-ray pending at time dictation.

## 2019-08-26 NOTE — DISCHARGE SUMMARY
Discharge Summary (SDC)





- Discharge


Final Diagnosis: 


Metastatic breast carcinoma





Date of Surgery: 08/26/19


Discharge Date: 08/26/19


Condition: Good


Treatment or Instructions: 


May use port at any time; no showering in 48 hours; follow-up with [place] 

surgical clinic in 2 weeks.  New preoperative medications diet and activity.


Referrals: 


EVELINA SANCHEZ PA-C [Primary Care Provider] - 


Discharge Diet: As Tolerated


Discharge Activity: Activity As Tolerated


Home Care Assistance: None Needed


Report the Following to Your Physician Immediately: Shortness of Breath, 

Increase in Pain, Fever over 101 Degrees

## 2019-08-26 NOTE — RADIOLOGY REPORT (SQ)
EXAM DESCRIPTION:  CHEST SINGLE VIEW



COMPLETED DATE/TIME:  8/26/2019 10:34 am



REASON FOR STUDY:  post right port placement C50.912  MALIGNANT NEOPLASM OF UNSPECIFIED SITE OF LEFT 
FEMAL



COMPARISON:  Earlier the same day.



NUMBER OF VIEWS:  One view.



TECHNIQUE:  Single frontal radiographic image of the chest acquired.



LIMITATIONS:  None.



FINDINGS:  LUNGS AND PLEURA: Grossly stable in appearance with right-sided port is been placed.  No p
neumothorax.  Catheter tip overlies the SVC right atrial junction.

MEDIASTINUM AND HILAR STRUCTURES: Unchanged.

HEART AND VASCULAR STRUCTURES: Unchanged.

SUPPORT DEVICES: As above.

BONES: No acute findings.

OTHER: No other significant finding.



IMPRESSION:  Hpisfz-H-Hyqp is been added.  No other interval change.  No pneumothorax.



TECHNICAL DOCUMENTATION:  JOB ID:  [id number]

 2011 [employer]- All Rights Reserved



Reading location - IP/workstation name: [email or web address]

## 2019-08-26 NOTE — RADIOLOGY REPORT (SQ)
EXAM DESCRIPTION:  CHEST SINGLE VIEW



COMPLETED DATE/TIME:  8/26/2019 8:25 am



REASON FOR STUDY:  pre-op



COMPARISON:  2/16/2016



NUMBER OF VIEWS:  One view.



TECHNIQUE:  Single frontal radiographic view of the chest acquired.



LIMITATIONS:  None.



FINDINGS:  LUNGS AND PLEURA: There is bilateral interstitial airspace disease with superimposed airsp
ace disease in both lung bases left greater than right.  This could represent atelectasis or pneumoni
a.  There may be underlying chronic interstitial lung disease.  There is asymmetric left apical pleur
al thickening.  There are small effusions left greater than right.

MEDIASTINUM AND HILAR STRUCTURES: No masses.  Contour normal.

HEART AND VASCULAR STRUCTURES: Heart normal in size.  Normal vasculature.

BONES: No acute findings.

HARDWARE: None in the chest.

OTHER: No other significant finding.



IMPRESSION:  Bilateral interstitial airspace disease with superimposed atelectasis or pneumonia in th
e lung bases left greater than right.  There are small bilateral pleural effusions.



TECHNICAL DOCUMENTATION:  JOB ID:  [id number]

 2011 [employer]- All Rights Reserved



Reading location - IP/workstation name: [email or web address]

## 2019-08-26 NOTE — RADIOLOGY REPORT (SQ)
EXAM DESCRIPTION:  FLUORO/CV PLACEMENT



COMPLETED DATE/TIME:  8/26/2019 10:21 am



REASON FOR STUDY:  PORTACATH PLACEMENT C50.912  MALIGNANT NEOPLASM OF UNSPECIFIED SITE OF LEFT FEMAL



COMPARISON:  None.



FLUOROSCOPY TIME:  0.1 minutes

Spot images saved to PACS.



TECHNIQUE:  Intra-operative images acquired during surgical procedure to evaluate progress.

NUMBER OF IMAGES: 3



LIMITATIONS:  None.



FINDINGS:  Fluoroscopy was provided for intraoperative procedure.  Please refer to the operative repo
rt for further discussion.



IMPRESSION:  IMAGE(S) OBTAINED DURING PROCEDURE.



COMMENT:  Quality :  Final reports for procedures using fluoroscopy that document radiation exp
osure indices, or exposure time and number of fluorographic images (if radiation exposure indices are
 not available)

Please consult full operative report of the attending physician for description of the procedure.



TECHNICAL DOCUMENTATION:  JOB ID:  [id number]

 2011 [employer]- All Rights Reserved



Reading location - IP/workstation name: [email or web address]

## 2019-09-01 ENCOUNTER — HOSPITAL ENCOUNTER (INPATIENT)
Dept: HOSPITAL 62 - ER | Age: 59
LOS: 11 days | Discharge: HOME HEALTH | DRG: 193 | End: 2019-09-12
Attending: INTERNAL MEDICINE | Admitting: INTERNAL MEDICINE
Payer: MEDICARE

## 2019-09-01 DIAGNOSIS — Z66: ICD-10-CM

## 2019-09-01 DIAGNOSIS — C79.51: ICD-10-CM

## 2019-09-01 DIAGNOSIS — C78.7: ICD-10-CM

## 2019-09-01 DIAGNOSIS — C78.00: ICD-10-CM

## 2019-09-01 DIAGNOSIS — J90: ICD-10-CM

## 2019-09-01 DIAGNOSIS — F41.9: ICD-10-CM

## 2019-09-01 DIAGNOSIS — C95.90: ICD-10-CM

## 2019-09-01 DIAGNOSIS — Z99.81: ICD-10-CM

## 2019-09-01 DIAGNOSIS — J84.10: ICD-10-CM

## 2019-09-01 DIAGNOSIS — J81.0: ICD-10-CM

## 2019-09-01 DIAGNOSIS — Z79.52: ICD-10-CM

## 2019-09-01 DIAGNOSIS — R19.7: ICD-10-CM

## 2019-09-01 DIAGNOSIS — J18.1: Primary | ICD-10-CM

## 2019-09-01 DIAGNOSIS — F32.9: ICD-10-CM

## 2019-09-01 DIAGNOSIS — J96.21: ICD-10-CM

## 2019-09-01 DIAGNOSIS — C50.919: ICD-10-CM

## 2019-09-01 DIAGNOSIS — N39.0: ICD-10-CM

## 2019-09-01 DIAGNOSIS — I48.91: ICD-10-CM

## 2019-09-01 DIAGNOSIS — Z87.891: ICD-10-CM

## 2019-09-01 DIAGNOSIS — R31.9: ICD-10-CM

## 2019-09-01 LAB
ADD MANUAL DIFF: NO
ALBUMIN SERPL-MCNC: 3 G/DL (ref 3.5–5)
ALP SERPL-CCNC: 333 U/L (ref 38–126)
ANION GAP SERPL CALC-SCNC: 5 MMOL/L (ref 5–19)
APPEARANCE UR: (no result)
APTT PPP: YELLOW S
ARTERIAL BLOOD FIO2: (no result)
ARTERIAL BLOOD H2CO3: 1.34 MMOL/L (ref 1.05–1.35)
ARTERIAL BLOOD HCO3: 26.1 MMOL/L (ref 20–24)
ARTERIAL BLOOD PCO2: 44.5 MMHG (ref 35–45)
ARTERIAL BLOOD PH: 7.39 (ref 7.35–7.45)
ARTERIAL BLOOD PO2: 45.1 MMHG (ref 80–100)
ARTERIAL BLOOD TOTAL CO2: 27.5 MMOL/L (ref 21–25)
AST SERPL-CCNC: 80 U/L (ref 14–36)
BASE EXCESS BLDA CALC-SCNC: 0.7 MMOL/L
BASOPHILS # BLD AUTO: 0.2 10^3/UL (ref 0–0.2)
BASOPHILS NFR BLD AUTO: 1.4 % (ref 0–2)
BILIRUB DIRECT SERPL-MCNC: 0.4 MG/DL (ref 0–0.4)
BILIRUB SERPL-MCNC: 0.5 MG/DL (ref 0.2–1.3)
BILIRUB UR QL STRIP: NEGATIVE
BUN SERPL-MCNC: 18 MG/DL (ref 7–20)
CALCIUM: 8.7 MG/DL (ref 8.4–10.2)
CHLORIDE SERPL-SCNC: 106 MMOL/L (ref 98–107)
CO2 SERPL-SCNC: 27 MMOL/L (ref 22–30)
EOSINOPHIL # BLD AUTO: 0.1 10^3/UL (ref 0–0.6)
EOSINOPHIL NFR BLD AUTO: 0.8 % (ref 0–6)
ERYTHROCYTE [DISTWIDTH] IN BLOOD BY AUTOMATED COUNT: 15.7 % (ref 11.5–14)
GLUCOSE SERPL-MCNC: 88 MG/DL (ref 75–110)
GLUCOSE UR STRIP-MCNC: NEGATIVE MG/DL
HCT VFR BLD CALC: 44.9 % (ref 36–47)
HGB BLD-MCNC: 14.9 G/DL (ref 12–15.5)
KETONES UR STRIP-MCNC: (no result) MG/DL
LYMPHOCYTES # BLD AUTO: 0.9 10^3/UL (ref 0.5–4.7)
LYMPHOCYTES NFR BLD AUTO: 8.4 % (ref 13–45)
MCH RBC QN AUTO: 30.1 PG (ref 27–33.4)
MCHC RBC AUTO-ENTMCNC: 33.3 G/DL (ref 32–36)
MCV RBC AUTO: 91 FL (ref 80–97)
MONOCYTES # BLD AUTO: 0.3 10^3/UL (ref 0.1–1.4)
MONOCYTES NFR BLD AUTO: 2.9 % (ref 3–13)
NEUTROPHILS # BLD AUTO: 9.6 10^3/UL (ref 1.7–8.2)
NEUTS SEG NFR BLD AUTO: 86.5 % (ref 42–78)
NITRITE UR QL STRIP: NEGATIVE
PH UR STRIP: 6 [PH] (ref 5–9)
PLATELET # BLD: 277 10^3/UL (ref 150–450)
POTASSIUM SERPL-SCNC: 4.1 MMOL/L (ref 3.6–5)
PROT SERPL-MCNC: 6 G/DL (ref 6.3–8.2)
PROT UR STRIP-MCNC: 30 MG/DL
RBC # BLD AUTO: 4.95 10^6/UL (ref 3.72–5.28)
SAO2 % BLDA: 80.3 % (ref 94–98)
SP GR UR STRIP: 1.02
TOTAL CELLS COUNTED % (AUTO): 100 %
UROBILINOGEN UR-MCNC: 4 MG/DL (ref ?–2)
WBC # BLD AUTO: 11.1 10^3/UL (ref 4–10.5)

## 2019-09-01 PROCEDURE — 84484 ASSAY OF TROPONIN QUANT: CPT

## 2019-09-01 PROCEDURE — 71045 X-RAY EXAM CHEST 1 VIEW: CPT

## 2019-09-01 PROCEDURE — 88305 TISSUE EXAM BY PATHOLOGIST: CPT

## 2019-09-01 PROCEDURE — 83605 ASSAY OF LACTIC ACID: CPT

## 2019-09-01 PROCEDURE — 87040 BLOOD CULTURE FOR BACTERIA: CPT

## 2019-09-01 PROCEDURE — 88313 SPECIAL STAINS GROUP 2: CPT

## 2019-09-01 PROCEDURE — 82565 ASSAY OF CREATININE: CPT

## 2019-09-01 PROCEDURE — 36600 WITHDRAWAL OF ARTERIAL BLOOD: CPT

## 2019-09-01 PROCEDURE — 94667 MNPJ CHEST WALL 1ST: CPT

## 2019-09-01 PROCEDURE — 83735 ASSAY OF MAGNESIUM: CPT

## 2019-09-01 PROCEDURE — 80202 ASSAY OF VANCOMYCIN: CPT

## 2019-09-01 PROCEDURE — 87070 CULTURE OTHR SPECIMN AEROBIC: CPT

## 2019-09-01 PROCEDURE — 80048 BASIC METABOLIC PNL TOTAL CA: CPT

## 2019-09-01 PROCEDURE — 96366 THER/PROPH/DIAG IV INF ADDON: CPT

## 2019-09-01 PROCEDURE — 85027 COMPLETE CBC AUTOMATED: CPT

## 2019-09-01 PROCEDURE — 85025 COMPLETE CBC W/AUTO DIFF WBC: CPT

## 2019-09-01 PROCEDURE — 85379 FIBRIN DEGRADATION QUANT: CPT

## 2019-09-01 PROCEDURE — 36415 COLL VENOUS BLD VENIPUNCTURE: CPT

## 2019-09-01 PROCEDURE — 88342 IMHCHEM/IMCYTCHM 1ST ANTB: CPT

## 2019-09-01 PROCEDURE — 80053 COMPREHEN METABOLIC PANEL: CPT

## 2019-09-01 PROCEDURE — 94660 CPAP INITIATION&MGMT: CPT

## 2019-09-01 PROCEDURE — 80162 ASSAY OF DIGOXIN TOTAL: CPT

## 2019-09-01 PROCEDURE — 82803 BLOOD GASES ANY COMBINATION: CPT

## 2019-09-01 PROCEDURE — 94799 UNLISTED PULMONARY SVC/PX: CPT

## 2019-09-01 PROCEDURE — 96365 THER/PROPH/DIAG IV INF INIT: CPT

## 2019-09-01 PROCEDURE — 71275 CT ANGIOGRAPHY CHEST: CPT

## 2019-09-01 PROCEDURE — 32555 ASPIRATE PLEURA W/ IMAGING: CPT

## 2019-09-01 PROCEDURE — 87086 URINE CULTURE/COLONY COUNT: CPT

## 2019-09-01 PROCEDURE — 88341 IMHCHEM/IMCYTCHM EA ADD ANTB: CPT

## 2019-09-01 PROCEDURE — 81001 URINALYSIS AUTO W/SCOPE: CPT

## 2019-09-01 PROCEDURE — 85730 THROMBOPLASTIN TIME PARTIAL: CPT

## 2019-09-01 PROCEDURE — 89050 BODY FLUID CELL COUNT: CPT

## 2019-09-01 PROCEDURE — 93005 ELECTROCARDIOGRAM TRACING: CPT

## 2019-09-01 PROCEDURE — 87205 SMEAR GRAM STAIN: CPT

## 2019-09-01 PROCEDURE — 99285 EMERGENCY DEPT VISIT HI MDM: CPT

## 2019-09-01 PROCEDURE — 93010 ELECTROCARDIOGRAM REPORT: CPT

## 2019-09-01 PROCEDURE — 85610 PROTHROMBIN TIME: CPT

## 2019-09-01 RX ADMIN — HEPARIN SODIUM SCH UNIT: 5000 INJECTION, SOLUTION INTRAVENOUS; SUBCUTANEOUS at 22:03

## 2019-09-01 RX ADMIN — CELECOXIB SCH MG: 200 CAPSULE ORAL at 17:36

## 2019-09-01 RX ADMIN — GUAIFENESIN SCH MG: 600 TABLET, EXTENDED RELEASE ORAL at 22:02

## 2019-09-01 RX ADMIN — FAMOTIDINE SCH MG: 20 TABLET, FILM COATED ORAL at 22:02

## 2019-09-01 RX ADMIN — GABAPENTIN SCH MG: 400 CAPSULE ORAL at 23:19

## 2019-09-01 RX ADMIN — SODIUM CHLORIDE PRN MLS/HR: 9 INJECTION, SOLUTION INTRAVENOUS at 17:37

## 2019-09-01 RX ADMIN — IPRATROPIUM BROMIDE AND ALBUTEROL SULFATE SCH ML: 2.5; .5 SOLUTION RESPIRATORY (INHALATION) at 15:35

## 2019-09-01 RX ADMIN — Medication SCH ML: at 22:09

## 2019-09-01 RX ADMIN — HYDROCODONE BITARTRATE AND ACETAMINOPHEN PRN TAB: 5; 325 TABLET ORAL at 22:06

## 2019-09-01 RX ADMIN — GABAPENTIN SCH MG: 400 CAPSULE ORAL at 17:36

## 2019-09-01 NOTE — EKG REPORT
SEVERITY:- ABNORMAL ECG -

SINUS RHYTHM

LOW VOLTAGE THROUGHOUT

CONSIDER ANTEROSEPTAL INFARCT

:

Confirmed by: Nick Damian MD 01-Sep-2019 15:00:12

## 2019-09-01 NOTE — PDOC H&P
History of Present Illness


Admission Date/PCP: 


  09/01/19 14:26





  EVELINA SANCHEZ PA-C





Patient complains of: shortness of breath


History of Present Illness: 


ROBERTA MASON is a 59 year old female with a past medical history significant 

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 

to the emergency department with a report of 2 weeks of gradually worsening 

nonproductive cough with shortness of breath rapidly worsening over the previous

2 to 3 days.  She also reports that she has had a poor appetite and 1-2 episodes

of diarrhea daily.  She reports posttussive emesis.  She states that she has not

yet begun any chemotherapy or immune modifiers for treatment of her cancer.


Evaluation in the emergency department reveals


Tachypnea with respiratory rate in the mid 30s, hypoxia on room air, 

leukocytosis (11.1), slightly elevated LFTs but otherwise normal chemistry, UTI 

by urinalysis, and chest x-ray demonstrating a left lung base pneumonia.


She started on IV Levaquin and referred to the hospitalist service for admission

and management of the above-stated complaints and findings.





Past Medical History


Cardiac Medical History: 


   Denies: Coronary Artery Disease, Myocardial Infarction, Hypertension


Pulmonary Medical History: Reports: Bronchitis


   Denies: Asthma, Chronic Obstructive Pulmonary Disease (COPD), Pneumonia


Neurological Medical History: 


   Denies: Ischemic CVA, Seizures


Endocrine Medical History: Reports: None


Malignancy Medical History: Reports: Bone Cancer, Breast Cancer - Primary, 

Leukemia, Liver Cancer


GI Medical History: Reports: None


Musculoskeltal Medical History: 


   Denies: Arthritis


Psychiatric Medical History: Reports: Depression


Traumatic Medical History: Reports: None


Hematology: 


   Denies: Anemia


Infectious Medical History: Reports: None





Past Surgical History


Past Surgical History: Reports: Mastectomy - Left, Orthopedic Surgery - neck, R 

Wrist, Left ankle


   Denies: Hysterectomy





Social History


Information Source: Patient


Lives with: Family


Smoking Status: Former Smoker


Frequency of Alcohol Use: None


Hx Recreational Drug Use: No


Drugs: None


Hx Prescription Drug Abuse: No





- Advance Directive


Resuscitation Status: Do Not Resuscitate


Surrogate healthcare decision maker:: 





The patient's sister, Lakesha Villalobos, [phone]





Family History


Family History: Reviewed & Not Pertinent


Parental Family History Reviewed: Yes


Children Family History Reviewed: Yes


Sibling(s) Family History Reviewed.: Yes





Medication/Allergy


Home Medications: 








Celecoxib [Celebrex 200 mg Capsule] 200 mg PO BID 09/01/19 


Diclofenac Sodium [Voltaren] 4 gm TOP QID 09/01/19 


Gabapentin [Neurontin] 800 mg PO Q6 09/01/19 


Hydrocodone/Acetaminophen [Norco 5-325 mg Tablet] 1 tab PO Q6HP PRN 09/01/19 


Letrozole [Femara 2.5 mg Tablet] 2.5 mg PO DAILY 09/01/19 


Zolpidem Tartrate [Ambien] 10 mg PO QHS 09/01/19 








Allergies/Adverse Reactions: 


                                        





No Known Allergies Allergy (Verified 08/26/19 07:00)


   











Review of Systems


Constitutional: PRESENT: anorexia, fatigue, weakness.  ABSENT: chills, fever(s),

 headache(s), weight gain, weight loss


Eyes: ABSENT: visual disturbances


Ears: ABSENT: hearing changes


Cardiovascular: PRESENT: dyspnea on exertion.  ABSENT: chest pain, edema, 

orthropnea, palpitations


Respiratory: PRESENT: cough, dyspnea.  ABSENT: hemoptysis


Gastrointestinal: PRESENT: diarrhea, vomiting.  ABSENT: abdominal pain, 

constipation, hematemesis, hematochezia, nausea


Genitourinary: ABSENT: dysuria, hematuria


Musculoskeletal: ABSENT: joint swelling


Integumentary: ABSENT: rash, wounds


Neurological: ABSENT: abnormal gait, abnormal speech, confusion, dizziness, 

focal weakness, syncope


Psychiatric: ABSENT: anxiety, depression, homidical ideation, suicidal ideation


Endocrine: ABSENT: cold intolerance, heat intolerance, polydipsia, polyuria


Hematologic/Lymphatic: ABSENT: easy bleeding, easy bruising





Physical Exam


Vital Signs: 


                                        











Temp Pulse Resp BP Pulse Ox


 


 97.8 F      20   153/63 H  96 


 


 09/01/19 15:01     09/01/19 16:01  09/01/19 16:01  09/01/19 16:01








                                 Intake & Output











 08/31/19 09/01/19 09/02/19





 06:59 06:59 06:59


 


Intake Total   150


 


Balance   150


 


Weight   57.606 kg











General appearance: PRESENT: cooperative, mild distress, well-developed, well-

nourished, other - Chronically ill-appearing


Head exam: PRESENT: atraumatic, normocephalic


Eye exam: PRESENT: conjunctiva pink, EOMI, PERRLA.  ABSENT: scleral icterus


Ear exam: PRESENT: normal external ear exam


Mouth exam: PRESENT: dry mucosa, tongue midline


Neck exam: ABSENT: carotid bruit, JVD, lymphadenopathy, thyromegaly


Respiratory exam: PRESENT: prolonged expiratory phas, rhonchi, symmetrical, 

tachypnea, other - Supplemental oxygen by nasal cannula.  ABSENT: rales, wheezes


Cardiovascular exam: PRESENT: RRR, +S1, +S2.  ABSENT: diastolic murmur, rubs, 

systolic murmur


Pulses: PRESENT: normal dorsalis pedis pul


Vascular exam: PRESENT: normal capillary refill


GI/Abdominal exam: PRESENT: normal bowel sounds, soft.  ABSENT: distended, 

guarding, mass, organolmegaly, rebound, tenderness


Rectal exam: PRESENT: deferred


Extremities exam: PRESENT: full ROM.  ABSENT: calf tenderness, clubbing, pedal 

edema


Neurological exam: PRESENT: alert, awake, oriented to person, oriented to place,

 oriented to time, oriented to situation, CN II-XII grossly intact.  ABSENT: 

motor sensory deficit


Psychiatric exam: PRESENT: appropriate affect, normal mood.  ABSENT: homicidal 

ideation, suicidal ideation


Skin exam: PRESENT: dry, intact, warm.  ABSENT: cyanosis, rash





Results


Laboratory Results: 


                                        





                                 09/01/19 10:09 





                                 09/01/19 11:14 





                                        











  09/01/19 09/01/19 09/01/19





  10:09 10:09 11:14


 


WBC  11.1 H  


 


RBC  4.95  


 


Hgb  14.9  


 


Hct  44.9  


 


MCV  91  


 


MCH  30.1  


 


MCHC  33.3  


 


RDW  15.7 H  


 


Plt Count  277  


 


Seg Neutrophils %  86.5 H  


 


Carbonic Acid   


 


HCO3/H2CO3 Ratio   


 


ABG pH   


 


ABG pCO2   


 


ABG pO2   


 


ABG HCO3   


 


ABG O2 Saturation   


 


ABG Base Excess   


 


FiO2   


 


Sodium   Cancelled  138.2


 


Potassium   Cancelled  4.1


 


Chloride   Cancelled  106


 


Carbon Dioxide   Cancelled  27


 


Anion Gap   Cancelled  5


 


BUN   Cancelled  18


 


Creatinine   Cancelled  0.64


 


Est GFR ( Amer)   Cancelled  > 60


 


Est GFR (Non-Af Amer)   Cancelled 


 


Glucose   Cancelled  88


 


Lactic Acid   


 


Calcium   Cancelled  8.7


 


Total Bilirubin   Cancelled  0.5


 


AST   Cancelled  80 H


 


Alkaline Phosphatase   Cancelled  333 H


 


Total Protein   Cancelled  6.0 L


 


Albumin   Cancelled  3.0 L


 


Urine Color   


 


Urine Appearance   


 


Urine pH   


 


Ur Specific Gravity   


 


Urine Protein   


 


Urine Glucose (UA)   


 


Urine Ketones   


 


Urine Blood   


 


Urine Nitrite   


 


Ur Leukocyte Esterase   


 


Urine WBC (Auto)   


 


Urine RBC (Auto)   














  09/01/19 09/01/19 09/01/19





  11:29 12:01 16:02


 


WBC   


 


RBC   


 


Hgb   


 


Hct   


 


MCV   


 


MCH   


 


MCHC   


 


RDW   


 


Plt Count   


 


Seg Neutrophils %   


 


Carbonic Acid   1.34 


 


HCO3/H2CO3 Ratio   19:1 


 


ABG pH   7.39 


 


ABG pCO2   44.5 


 


ABG pO2   45.1 L 


 


ABG HCO3   26.1 H 


 


ABG O2 Saturation   80.3 L 


 


ABG Base Excess   0.7 


 


FiO2   2L 


 


Sodium   


 


Potassium   


 


Chloride   


 


Carbon Dioxide   


 


Anion Gap   


 


BUN   


 


Creatinine   


 


Est GFR ( Amer)   


 


Est GFR (Non-Af Amer)   


 


Glucose   


 


Lactic Acid  1.2  


 


Calcium   


 


Total Bilirubin   


 


AST   


 


Alkaline Phosphatase   


 


Total Protein   


 


Albumin   


 


Urine Color    YELLOW


 


Urine Appearance    CLOUDY


 


Urine pH    6.0


 


Ur Specific Gravity    1.021


 


Urine Protein    30 H


 


Urine Glucose (UA)    NEGATIVE


 


Urine Ketones    TRACE H


 


Urine Blood    SMALL H


 


Urine Nitrite    NEGATIVE


 


Ur Leukocyte Esterase    LARGE H


 


Urine WBC (Auto)    91


 


Urine RBC (Auto)    13











Impressions: 


                                        





Chest X-Ray  09/01/19 09:57


IMPRESSION:  DIFFUSE CHRONIC INTERSTITIAL CHANGES.  INCREASING AIRSPACE DISEASE 

IN THE LEFT LUNG BASE MAY REPRESENT DEVELOPING PNEUMONIA.


 














Assessment and Plan





- Diagnosis


(1) Pneumonia


Qualifiers: 


   Pneumonia type: due to unspecified organism   Laterality: left   Lung 

location: lower lobe of lung   Qualified Code(s): J18.1 - Lobar pneumonia, 

unspecified organism   


Is this a current diagnosis for this admission?: Yes   


Plan: 


Chest x-ray demonstrates left lower lobe pneumonia.


Blood and sputum cultures are pending.





The patient is admitted to the medical floor and continuous cardiac telemetry.


She is provided supplemental oxygen and BiPAP as needed to maintain saturations.


She started on scheduled and as needed nebulizer treatments.


She is empirically placed on IV Levaquin.  Will adjust as cultures result.


Tessalon Perles and Robitussin as needed.


Incentive spirometer and flutter valve to bedside.








(2) Acute respiratory failure with hypoxemia


Is this a current diagnosis for this admission?: Yes   


Plan: 


Secondary to LLL PNA


Evaluation and management as above.








(3) Metastatic breast cancer


Is this a current diagnosis for this admission?: Yes   


Plan: 


Has not yet been started on chemotherapy or immune modifiers.


Consider heme/onc consultation.


Follow up with outpatient Oncologist as scheduled.


Continue outpatient pain management regimen








- Time


Time Spent with patient: 35 or more minutes


Medications reviewed and adjusted accordingly: Yes





- Inpatient Certification


Based on my medical assessment, after consideration of the patient's 

comorbidities, presenting symptoms, or acuity I expect that the services needed 

warrant INPATIENT care.: Yes


I certify that my determination is in accordance with my understanding of 

Medicare's requirements for reasonable and necessary INPATIENT services [42 CFR 

412.3e].: Yes


Medical Necessity: Need Close Monitoring Due to Risk of Patient Decompensation, 

Need for Nebulizer Therapy and Monitoring of Response, Need for IV Antibiotics, 

Risk of Complication if Not Cared For in Hospital

## 2019-09-01 NOTE — ER DOCUMENT REPORT
Entered by KELLY ROWLEY SCRIBE  09/01/19 1018 





Acting as scribe for:HALLIE WALLACE MD





ED Respiratory Problem





- General


Chief Complaint: Shortness Of Breath


Stated Complaint: TROUBLE BREATHING


Time Seen by Provider: 09/01/19 10:06


Primary Care Provider: 


EVELINA SANCHEZ PA-C [Primary Care Provider] - Follow up as needed


Mode of Arrival: Medic


Information source: Patient, [facility] Records


Notes: 





59 year old female with stage IV breast cancer with metastasis to liver and bone

that presents to the emergency department today with complaints of shortness of 

breath.  EMS reported a room air saturation of 89% on room air, patient was 

tachypneic into the 30s. Patient's oxygen saturation renita to 95% when placed on 

3L of oxygen via nasal cannula.  Patient states her last chest x-ray was about 

x1 week ago and she was told she had some "fluid on her left lung".  Patient 

denies fevers.  She does report a frequent nonproductive cough.


TRAVEL OUTSIDE OF THE [place] IN LAST 30 DAYS: No





- Related Data


Allergies/Adverse Reactions: 


                                        





No Known Allergies Allergy (Verified 08/26/19 07:00)


   











Past Medical History





- General


Information source: Patient, [facility] Records





- Social History


Smoking Status: Current Every Day Smoker


Cigarette use (# per day): Yes


Frequency of alcohol use: None


Drug Abuse: None


Lives with: Family


Family History: Reviewed & Not Pertinent


Malignancy Medical History: Reports: Hx Bone Cancer, Hx Breast Cancer - Primary,

Hx Leukemia, Hx Liver Cancer


Psychiatric Medical History: Reports: Hx Depression


Past Surgical History: Reports: Hx Mastectomy - Left, Hx Orthopedic Surgery - 

neck, R Wrist, Left ankle





- Immunizations


Hx Diphtheria, Pertussis, Tetanus Vaccination: Yes


Hx Pneumococcal Vaccination: 01/01/17





Review of Systems





- Review of Systems


Constitutional: denies: Fever


EENT: No symptoms reported


Cardiovascular: No symptoms reported


Respiratory: See HPI, Short of breath


Gastrointestinal: No symptoms reported


Genitourinary: No symptoms reported


Female Genitourinary: No symptoms reported


Musculoskeletal: No symptoms reported


Skin: No symptoms reported


Hematologic/Lymphatic: No symptoms reported


Neurological/Psychological: No symptoms reported


-: Yes All other systems reviewed and negative





Physical Exam





- Vital signs


Vitals: 


                                        











Resp BP Pulse Ox


 


 30 H  136/65 H  93 


 


 09/01/19 10:05  09/01/19 10:05  09/01/19 10:05














- Notes


Notes: 





Physical Exam:


 


General: Alert, appears older than stated age. 


 


HEENT: Normocephalic. Atraumatic. PERRL. Extraocular movements intact. 

Oropharynx clear.


 


Neck: Supple. Non-tender.


 


Respiratory: No respiratory distress. Tachypneic. Rales and crackles in the 

lower fields bilaterally. 


 


Cardiovascular: Regular rate and rhythm. 


 


Abdominal: Normal Inspection. Non-tender. No distension. Normal Bowel Sounds. 


 


Back: No gross abnormalities. 


 


Extremities: Moves all four extremities.


Upper extremities: Normal inspection. Normal ROM.  


Lower extremities: Normal inspection. No edema. Normal ROM.


 


Neurological: Normal cognition. AAOx4. Normal speech.  


 


Psychological: Normal affect. Normal Mood. 


 


Skin: Warm. Dry. Normal color.





Course





- Vital Signs


Vital signs: 


                                        











Temp Pulse Resp BP Pulse Ox


 


 98.1 F      18   139/63 H  94 


 


 09/01/19 10:22     09/01/19 11:00  09/01/19 12:02  09/01/19 12:02














- Laboratory


Result Diagrams: 


                                 09/01/19 10:09





                                 09/01/19 11:14


Laboratory results interpreted by me: 


                                        











  09/01/19 09/01/19 09/01/19





  10:09 11:14 12:01


 


WBC  11.1 H  


 


RDW  15.7 H  


 


Lymph % (Auto)  8.4 L  


 


Mono % (Auto)  2.9 L  


 


Absolute Neuts (auto)  9.6 H  


 


Seg Neutrophils %  86.5 H  


 


ABG pO2    45.1 L


 


ABG HCO3    26.1 H


 


ABG Total CO2    27.5 H


 


ABG O2 Saturation    80.3 L


 


AST   80 H 


 


Alkaline Phosphatase   333 H 


 


Total Protein   6.0 L 


 


Albumin   3.0 L 














- Diagnostic Test


Radiology reviewed: Image reviewed, Reports reviewed - Chest x-ray shows diffuse

chronic interstitial changes with increasing airspace disease in the left lung 

that may represent developing pneumonia.





- EKG Interpretation by Me


EKG shows normal: Sinus rhythm, Axis, Intervals, QRS Complexes, ST-T Waves


Rate: Normal - 80


Rhythm: NSR


Voltage: Decreased voltage


When compared to previous EKG there are: No significant change





- Consults


  ** Luh Green NP


Time consulted: 13:45


Consulted provider: will come to ER





Discharge





- Discharge


Clinical Impression: 


 Shortness of breath





Pneumonia


Qualifiers:


 Pneumonia type: due to unspecified organism Laterality: left Lung location: 

lower lobe of lung Qualified Code(s): J18.1 - Lobar pneumonia, unspecified 

organism





Condition: Good


Disposition: ADMITTED AS INPATIENT


Admitting Provider: Mathieu (Hospitalist)


Unit Admitted: Telemetry


Referrals: 


EVELINA SANCHEZ PA-C [Primary Care Provider] - Follow up as needed


Scribe Attestation: 





09/01/19 11:42


I personally performed the services described in the documentation, reviewed and

edited the documentation which was dictated to the scribe in my presence, and it

accurately records my words and actions.





I personally performed the services described in the documentation, reviewed and

edited the documentation which was dictated to the scribe in my presence, and it

accurately records my words and actions.

## 2019-09-01 NOTE — RADIOLOGY REPORT (SQ)
EXAM DESCRIPTION:  CHEST SINGLE VIEW



COMPLETED DATE/TIME:  9/1/2019 10:42 am



REASON FOR STUDY:  SOB



COMPARISON:  8/26/2019.



EXAM PARAMETERS:  NUMBER OF VIEWS: One view.

TECHNIQUE: Single frontal radiographic view of the chest acquired.

RADIATION DOSE: NA

LIMITATIONS: None.



FINDINGS:  LUNGS AND PLEURA: Diffuse chronic interstitial changes.  Increasing airspace disease in th
e left lung base.  Left pleural effusion.

MEDIASTINUM AND HILAR STRUCTURES: No masses.  Contour normal.

HEART AND VASCULAR STRUCTURES: Heart normal in size.  Normal vasculature.

BONES: No acute findings.

HARDWARE: Vascular port.  Surgical clips.

OTHER: No other significant finding.



IMPRESSION:  DIFFUSE CHRONIC INTERSTITIAL CHANGES.  INCREASING AIRSPACE DISEASE IN THE LEFT LUNG BASE
 MAY REPRESENT DEVELOPING PNEUMONIA.



TECHNICAL DOCUMENTATION:  JOB ID:  [id number]

 2011 [employer]- All Rights Reserved



Reading location - IP/workstation name: [email or web address]

## 2019-09-02 LAB
ADD MANUAL DIFF: NO
ANION GAP SERPL CALC-SCNC: 6 MMOL/L (ref 5–19)
ARTERIAL BLOOD FIO2: (no result)
ARTERIAL BLOOD H2CO3: 1.1 MMOL/L (ref 1.05–1.35)
ARTERIAL BLOOD HCO3: 22.7 MMOL/L (ref 20–24)
ARTERIAL BLOOD PCO2: 36.5 MMHG (ref 35–45)
ARTERIAL BLOOD PH: 7.41 (ref 7.35–7.45)
ARTERIAL BLOOD PO2: 63.3 MMHG (ref 80–100)
ARTERIAL BLOOD TOTAL CO2: 23.8 MMOL/L (ref 21–25)
BASE EXCESS BLDA CALC-SCNC: -1.5 MMOL/L
BASOPHILS # BLD AUTO: 0.1 10^3/UL (ref 0–0.2)
BASOPHILS NFR BLD AUTO: 0.8 % (ref 0–2)
BUN SERPL-MCNC: 12 MG/DL (ref 7–20)
CALCIUM: 8.6 MG/DL (ref 8.4–10.2)
CHLORIDE SERPL-SCNC: 106 MMOL/L (ref 98–107)
CO2 SERPL-SCNC: 25 MMOL/L (ref 22–30)
EOSINOPHIL # BLD AUTO: 0 10^3/UL (ref 0–0.6)
EOSINOPHIL NFR BLD AUTO: 0.4 % (ref 0–6)
ERYTHROCYTE [DISTWIDTH] IN BLOOD BY AUTOMATED COUNT: 15.4 % (ref 11.5–14)
GLUCOSE SERPL-MCNC: 85 MG/DL (ref 75–110)
HCT VFR BLD CALC: 39.3 % (ref 36–47)
HGB BLD-MCNC: 13.2 G/DL (ref 12–15.5)
LYMPHOCYTES # BLD AUTO: 1 10^3/UL (ref 0.5–4.7)
LYMPHOCYTES NFR BLD AUTO: 12.2 % (ref 13–45)
MCH RBC QN AUTO: 29.9 PG (ref 27–33.4)
MCHC RBC AUTO-ENTMCNC: 33.5 G/DL (ref 32–36)
MCV RBC AUTO: 89 FL (ref 80–97)
MONOCYTES # BLD AUTO: 0.2 10^3/UL (ref 0.1–1.4)
MONOCYTES NFR BLD AUTO: 2.8 % (ref 3–13)
NEUTROPHILS # BLD AUTO: 6.6 10^3/UL (ref 1.7–8.2)
NEUTS SEG NFR BLD AUTO: 83.8 % (ref 42–78)
PLATELET # BLD: 214 10^3/UL (ref 150–450)
POTASSIUM SERPL-SCNC: 4.2 MMOL/L (ref 3.6–5)
RBC # BLD AUTO: 4.39 10^6/UL (ref 3.72–5.28)
SAO2 % BLDA: 92.6 % (ref 94–98)
TOTAL CELLS COUNTED % (AUTO): 100 %
WBC # BLD AUTO: 7.8 10^3/UL (ref 4–10.5)

## 2019-09-02 RX ADMIN — METHYLPREDNISOLONE SODIUM SUCCINATE SCH MG: 40 INJECTION, POWDER, FOR SOLUTION INTRAMUSCULAR; INTRAVENOUS at 13:31

## 2019-09-02 RX ADMIN — LEVOFLOXACIN SCH MLS/HR: 750 INJECTION, SOLUTION INTRAVENOUS at 09:10

## 2019-09-02 RX ADMIN — LETROZOLE SCH MG: 2.5 TABLET, FILM COATED ORAL at 09:10

## 2019-09-02 RX ADMIN — SODIUM CHLORIDE PRN MLS/HR: 9 INJECTION, SOLUTION INTRAVENOUS at 01:38

## 2019-09-02 RX ADMIN — CELECOXIB SCH MG: 200 CAPSULE ORAL at 16:14

## 2019-09-02 RX ADMIN — FAMOTIDINE SCH MG: 20 TABLET, FILM COATED ORAL at 09:10

## 2019-09-02 RX ADMIN — GUAIFENESIN SCH MG: 600 TABLET, EXTENDED RELEASE ORAL at 09:10

## 2019-09-02 RX ADMIN — HEPARIN SODIUM SCH UNIT: 5000 INJECTION, SOLUTION INTRAVENOUS; SUBCUTANEOUS at 21:52

## 2019-09-02 RX ADMIN — IPRATROPIUM BROMIDE AND ALBUTEROL SULFATE SCH ML: 2.5; .5 SOLUTION RESPIRATORY (INHALATION) at 00:09

## 2019-09-02 RX ADMIN — FAMOTIDINE SCH MG: 20 TABLET, FILM COATED ORAL at 21:52

## 2019-09-02 RX ADMIN — IPRATROPIUM BROMIDE AND ALBUTEROL SULFATE SCH ML: 2.5; .5 SOLUTION RESPIRATORY (INHALATION) at 15:49

## 2019-09-02 RX ADMIN — SODIUM CHLORIDE PRN MLS/HR: 9 INJECTION, SOLUTION INTRAVENOUS at 16:15

## 2019-09-02 RX ADMIN — Medication SCH: at 13:07

## 2019-09-02 RX ADMIN — METHYLPREDNISOLONE SODIUM SUCCINATE SCH MG: 40 INJECTION, POWDER, FOR SOLUTION INTRAMUSCULAR; INTRAVENOUS at 21:52

## 2019-09-02 RX ADMIN — LORAZEPAM PRN MG: 0.5 TABLET ORAL at 11:11

## 2019-09-02 RX ADMIN — HEPARIN SODIUM SCH: 5000 INJECTION, SOLUTION INTRAVENOUS; SUBCUTANEOUS at 13:07

## 2019-09-02 RX ADMIN — GABAPENTIN SCH MG: 400 CAPSULE ORAL at 10:48

## 2019-09-02 RX ADMIN — Medication SCH: at 05:52

## 2019-09-02 RX ADMIN — ZOLPIDEM TARTRATE SCH MG: 5 TABLET ORAL at 21:52

## 2019-09-02 RX ADMIN — CELECOXIB SCH MG: 200 CAPSULE ORAL at 09:10

## 2019-09-02 RX ADMIN — IPRATROPIUM BROMIDE AND ALBUTEROL SULFATE SCH ML: 2.5; .5 SOLUTION RESPIRATORY (INHALATION) at 08:20

## 2019-09-02 RX ADMIN — GABAPENTIN SCH MG: 400 CAPSULE ORAL at 05:46

## 2019-09-02 RX ADMIN — GUAIFENESIN SCH MG: 600 TABLET, EXTENDED RELEASE ORAL at 21:52

## 2019-09-02 RX ADMIN — HEPARIN SODIUM SCH UNIT: 5000 INJECTION, SOLUTION INTRAVENOUS; SUBCUTANEOUS at 05:46

## 2019-09-02 RX ADMIN — ACETAMINOPHEN PRN MG: 325 TABLET ORAL at 05:50

## 2019-09-02 RX ADMIN — GABAPENTIN SCH MG: 400 CAPSULE ORAL at 16:14

## 2019-09-02 RX ADMIN — Medication SCH: at 21:53

## 2019-09-02 RX ADMIN — HYDROCODONE BITARTRATE AND ACETAMINOPHEN PRN TAB: 5; 325 TABLET ORAL at 17:17

## 2019-09-02 RX ADMIN — SODIUM CHLORIDE PRN MLS/HR: 9 INJECTION, SOLUTION INTRAVENOUS at 09:12

## 2019-09-02 NOTE — PDOC PROGRESS REPORT
Subjective


Progress Note for:: 09/02/19


Subjective:: 


ROBERTA MASON is a 59 year old female with a past medical history significant 

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 

admitted 9/1/19 for pneumonia.





The patient was seen on morning rounds.  She is found resting in bed comfortably

on supplemental oxygen via BiPAP.  She is noted to be much more comfortable 

today with decreased work of breathing, speaking in full sentences, and 

increased alertness.


She reports that she is feeling much better, however notes that she becomes 

severely short of breath shortly after taking BiPAP off.  Has only been able to 

tolerate 20 to 30 minutes at a time


Otherwise, she denies fever, chills, chest pain, palpitations, orthopnea, 

abdominal pain, nausea vomiting or diarrhea.


She has no other questions or concerns at this time.


No concerns per nursing.


Reason For Visit: 


PNEUMONIA








Physical Exam


Vital Signs: 


                                        











Temp Pulse Resp BP Pulse Ox


 


 97.7 F   96   34 H  138/76 H  92 


 


 09/02/19 08:20  09/02/19 13:58  09/02/19 12:11  09/02/19 08:20  09/02/19 12:11








                                 Intake & Output











 09/01/19 09/02/19 09/03/19





 06:59 06:59 06:59


 


Intake Total  1550 1096


 


Output Total   0


 


Balance  1550 1096


 


Weight  53.6 kg 











General appearance: PRESENT: no acute distress, cooperative, thin, well-

developed, well-nourished


Head exam: PRESENT: atraumatic, normocephalic


Eye exam: PRESENT: conjunctiva pink, EOMI, PERRLA.  ABSENT: scleral icterus


Ear exam: PRESENT: normal external ear exam


Mouth exam: PRESENT: moist, tongue midline


Teeth exam: PRESENT: poor dentation


Neck exam: ABSENT: carotid bruit, JVD, lymphadenopathy, thyromegaly


Respiratory exam: PRESENT: decreased breath sounds - bibasilar, prolonged 

expiratory phas, symmetrical, tachypnea, wheezes.  ABSENT: rales, rhonchi


Cardiovascular exam: PRESENT: RRR, +S1, +S2.  ABSENT: diastolic murmur, rubs, 

systolic murmur


Pulses: PRESENT: normal dorsalis pedis pul


Vascular exam: PRESENT: normal capillary refill


GI/Abdominal exam: PRESENT: normal bowel sounds, soft.  ABSENT: distended, 

guarding, mass, organolmegaly, rebound, tenderness


Rectal exam: PRESENT: deferred


Extremities exam: PRESENT: full ROM.  ABSENT: calf tenderness, clubbing, pedal 

edema


Neurological exam: PRESENT: alert, awake, oriented to person, oriented to place,

oriented to time, oriented to situation, CN II-XII grossly intact.  ABSENT: 

motor sensory deficit


Psychiatric exam: PRESENT: appropriate affect, normal mood.  ABSENT: homicidal 

ideation, suicidal ideation


Skin exam: PRESENT: dry, intact, warm.  ABSENT: cyanosis, rash





Results


Laboratory Results: 


                                        





                                 09/02/19 06:49 





                                 09/02/19 06:49 





                                        











  09/01/19 09/02/19 09/02/19





  16:02 06:49 06:49


 


WBC   7.8 


 


RBC   4.39 


 


Hgb   13.2 


 


Hct   39.3 


 


MCV   89 


 


MCH   29.9 


 


MCHC   33.5 


 


RDW   15.4 H 


 


Plt Count   214 


 


Seg Neutrophils %   83.8 H 


 


Carbonic Acid   


 


HCO3/H2CO3 Ratio   


 


ABG pH   


 


ABG pCO2   


 


ABG pO2   


 


ABG HCO3   


 


ABG O2 Saturation   


 


ABG Base Excess   


 


FiO2   


 


Sodium    137.4


 


Potassium    4.2


 


Chloride    106


 


Carbon Dioxide    25


 


Anion Gap    6


 


BUN    12


 


Creatinine    0.58


 


Est GFR ( Amer)    > 60


 


Glucose    85


 


Calcium    8.6


 


Urine Color  YELLOW  


 


Urine Appearance  CLOUDY  


 


Urine pH  6.0  


 


Ur Specific Gravity  1.021  


 


Urine Protein  30 H  


 


Urine Glucose (UA)  NEGATIVE  


 


Urine Ketones  TRACE H  


 


Urine Blood  SMALL H  


 


Urine Nitrite  NEGATIVE  


 


Ur Leukocyte Esterase  LARGE H  


 


Urine WBC (Auto)  91  


 


Urine RBC (Auto)  13  














  09/02/19





  10:43


 


WBC 


 


RBC 


 


Hgb 


 


Hct 


 


MCV 


 


MCH 


 


MCHC 


 


RDW 


 


Plt Count 


 


Seg Neutrophils % 


 


Carbonic Acid  1.10


 


HCO3/H2CO3 Ratio  20:1


 


ABG pH  7.41


 


ABG pCO2  36.5


 


ABG pO2  63.3 L


 


ABG HCO3  22.7


 


ABG O2 Saturation  92.6 L


 


ABG Base Excess  -1.5


 


FiO2  40%


 


Sodium 


 


Potassium 


 


Chloride 


 


Carbon Dioxide 


 


Anion Gap 


 


BUN 


 


Creatinine 


 


Est GFR (African Amer) 


 


Glucose 


 


Calcium 


 


Urine Color 


 


Urine Appearance 


 


Urine pH 


 


Ur Specific Gravity 


 


Urine Protein 


 


Urine Glucose (UA) 


 


Urine Ketones 


 


Urine Blood 


 


Urine Nitrite 


 


Ur Leukocyte Esterase 


 


Urine WBC (Auto) 


 


Urine RBC (Auto) 











Impressions: 


                                        





Chest X-Ray  09/01/19 09:57


IMPRESSION:  DIFFUSE CHRONIC INTERSTITIAL CHANGES.  INCREASING AIRSPACE DISEASE 

IN THE LEFT LUNG BASE MAY REPRESENT DEVELOPING PNEUMONIA.


 














Assessment and Plan





- Diagnosis


(1) Pneumonia


Qualifiers: 


   Pneumonia type: due to unspecified organism   Laterality: left   Lung 

location: lower lobe of lung   Qualified Code(s): J18.1 - Lobar pneumonia, u

nspecified organism   


Is this a current diagnosis for this admission?: Yes   


Plan: 


Chest x-ray demonstrates left lower lobe pneumonia.


Blood and sputum cultures have no growth to date.


ABG is reassuring; no hypercapnia





The patient is admitted to the medical floor and continuous cardiac telemetry.


She is provided supplemental oxygen.


Discussed with respiratory therapy, will discontinue BiPAP and trial high flow 

nasal cannula


Continue on scheduled and as needed nebulizer treatments.


She is empirically placed on IV Levaquin.  Will adjust as cultures result.


Tessalon Perles and Robitussin as needed.


Incentive spirometer and flutter valve to bedside.








(2) Acute respiratory failure with hypoxemia


Is this a current diagnosis for this admission?: Yes   


Plan: 


Secondary to LLL PNA


Pt is a previous long term tobacco user, thin frame.  Noted to have wheezing 

today. 


Possible undiagnosed COPD.





Evaluation and management as above.


In addition, will trial steroid therapy.








(3) Metastatic breast cancer


Is this a current diagnosis for this admission?: Yes   


Plan: 


Has not yet been started on chemotherapy or immune modifiers.


Consider heme/onc consultation.


Follow up with outpatient Oncologist as scheduled.


Continue outpatient pain management regimen








(4) Anxiety


Is this a current diagnosis for this admission?: Yes   


Plan: 


prn p.o. Ativan








- Time


Time Spent with patient: 15-24 minutes


Medications reviewed and adjusted accordingly: Yes


Anticipated discharge: Home


Within: within 72 hours

## 2019-09-02 NOTE — PROGRESS NOTE ACKNOWLEDGEMENT
Progress Note Acknowledgement


Progess Note Acknowledgement: 


I, the undersigned member of the medical staff with appropriate privileges and 

with supervisory authority over Luh Green, a [facility] practice allied 

health professional, acknowledge that I have reviewed the progress notes entered

on this patient, and in my professional judgment believe that the assessment 

made and/or any care evidenced was appropriate

## 2019-09-03 LAB
ARTERIAL BLOOD FIO2: (no result)
ARTERIAL BLOOD H2CO3: 1.77 MMOL/L (ref 1.05–1.35)
ARTERIAL BLOOD H2CO3: 1.8 MMOL/L (ref 1.05–1.35)
ARTERIAL BLOOD HCO3: 25.1 MMOL/L (ref 20–24)
ARTERIAL BLOOD HCO3: 26.7 MMOL/L (ref 20–24)
ARTERIAL BLOOD PCO2: 58.7 MMHG (ref 35–45)
ARTERIAL BLOOD PCO2: 59.7 MMHG (ref 35–45)
ARTERIAL BLOOD PH: 7.24 (ref 7.35–7.45)
ARTERIAL BLOOD PH: 7.28 (ref 7.35–7.45)
ARTERIAL BLOOD PO2: 72.9 MMHG (ref 80–100)
ARTERIAL BLOOD PO2: 74.1 MMHG (ref 80–100)
ARTERIAL BLOOD TOTAL CO2: 27 MMOL/L (ref 21–25)
ARTERIAL BLOOD TOTAL CO2: 28.5 MMOL/L (ref 21–25)
BASE EXCESS BLDA CALC-SCNC: -1.4 MMOL/L
BASE EXCESS BLDA CALC-SCNC: -3.4 MMOL/L
SAO2 % BLDA: 91.7 % (ref 94–98)
SAO2 % BLDA: 92.7 % (ref 94–98)

## 2019-09-03 RX ADMIN — LEVOFLOXACIN SCH MLS/HR: 750 INJECTION, SOLUTION INTRAVENOUS at 11:46

## 2019-09-03 RX ADMIN — AMLODIPINE BESYLATE SCH MG: 5 TABLET ORAL at 11:46

## 2019-09-03 RX ADMIN — GABAPENTIN SCH MG: 400 CAPSULE ORAL at 17:53

## 2019-09-03 RX ADMIN — METHYLPREDNISOLONE SODIUM SUCCINATE SCH MG: 40 INJECTION, POWDER, FOR SOLUTION INTRAMUSCULAR; INTRAVENOUS at 13:42

## 2019-09-03 RX ADMIN — Medication SCH: at 05:45

## 2019-09-03 RX ADMIN — GUAIFENESIN SCH MG: 600 TABLET, EXTENDED RELEASE ORAL at 11:46

## 2019-09-03 RX ADMIN — FAMOTIDINE SCH: 20 TABLET, FILM COATED ORAL at 21:17

## 2019-09-03 RX ADMIN — Medication SCH: at 13:43

## 2019-09-03 RX ADMIN — LETROZOLE SCH MG: 2.5 TABLET, FILM COATED ORAL at 11:49

## 2019-09-03 RX ADMIN — METHYLPREDNISOLONE SODIUM SUCCINATE SCH MG: 40 INJECTION, POWDER, FOR SOLUTION INTRAMUSCULAR; INTRAVENOUS at 21:15

## 2019-09-03 RX ADMIN — CELECOXIB SCH MG: 200 CAPSULE ORAL at 17:53

## 2019-09-03 RX ADMIN — HEPARIN SODIUM SCH UNIT: 5000 INJECTION, SOLUTION INTRAVENOUS; SUBCUTANEOUS at 13:42

## 2019-09-03 RX ADMIN — GABAPENTIN SCH MG: 400 CAPSULE ORAL at 05:44

## 2019-09-03 RX ADMIN — HEPARIN SODIUM SCH UNIT: 5000 INJECTION, SOLUTION INTRAVENOUS; SUBCUTANEOUS at 21:15

## 2019-09-03 RX ADMIN — ZOLPIDEM TARTRATE SCH: 5 TABLET ORAL at 21:16

## 2019-09-03 RX ADMIN — METHYLPREDNISOLONE SODIUM SUCCINATE SCH MG: 40 INJECTION, POWDER, FOR SOLUTION INTRAMUSCULAR; INTRAVENOUS at 05:44

## 2019-09-03 RX ADMIN — GABAPENTIN SCH: 400 CAPSULE ORAL at 23:36

## 2019-09-03 RX ADMIN — IPRATROPIUM BROMIDE AND ALBUTEROL SULFATE SCH ML: 2.5; .5 SOLUTION RESPIRATORY (INHALATION) at 08:12

## 2019-09-03 RX ADMIN — GUAIFENESIN SCH: 600 TABLET, EXTENDED RELEASE ORAL at 21:17

## 2019-09-03 RX ADMIN — FAMOTIDINE SCH MG: 20 TABLET, FILM COATED ORAL at 11:47

## 2019-09-03 RX ADMIN — IPRATROPIUM BROMIDE AND ALBUTEROL SULFATE SCH ML: 2.5; .5 SOLUTION RESPIRATORY (INHALATION) at 16:03

## 2019-09-03 RX ADMIN — HEPARIN SODIUM SCH UNIT: 5000 INJECTION, SOLUTION INTRAVENOUS; SUBCUTANEOUS at 05:44

## 2019-09-03 RX ADMIN — GABAPENTIN SCH MG: 400 CAPSULE ORAL at 11:47

## 2019-09-03 RX ADMIN — CELECOXIB SCH MG: 200 CAPSULE ORAL at 11:47

## 2019-09-03 RX ADMIN — IPRATROPIUM BROMIDE AND ALBUTEROL SULFATE SCH ML: 2.5; .5 SOLUTION RESPIRATORY (INHALATION) at 00:05

## 2019-09-03 RX ADMIN — GABAPENTIN SCH: 400 CAPSULE ORAL at 00:16

## 2019-09-03 RX ADMIN — LORAZEPAM PRN MG: 0.5 TABLET ORAL at 13:42

## 2019-09-03 RX ADMIN — IPRATROPIUM BROMIDE AND ALBUTEROL SULFATE SCH ML: 2.5; .5 SOLUTION RESPIRATORY (INHALATION) at 20:33

## 2019-09-03 RX ADMIN — Medication SCH: at 21:17

## 2019-09-03 NOTE — PROGRESS NOTE
Provider Note


Provider Note: 





9/3/2019-called patient room she is having acute attack of shortness of breath. 

Patient tachypneic satting 86% on 85% BiPAP.  ABG shows an acute hypercapnic 

restorative failure and chest x-ray shows an acute flash pulmonary edema.  At 

this time we have increased patient's pressures on her BiPAP to 16 which seems t

o help with her tidal volumes and comfort.  I have given patient 40 of Lasix IV,

2 morphine, and 1/2 inch Nitropaste.  I will remove patient IMCU at this time 

and placed on Lasix drip at 10 mg an hour.  I will reassess patient in the a.m. 

with chest x-ray and a ABG at that time determine if need to have radiology A 

left pleural effusion.

## 2019-09-03 NOTE — RADIOLOGY REPORT (SQ)
EXAM DESCRIPTION:  CHEST SINGLE VIEW



COMPLETED DATE/TIME:  9/3/2019 3:09 pm



REASON FOR STUDY:  SOB



COMPARISON:  CT chest 8/2/2019, 7/5/2018

Chest films 9/1/2019, 8/26/2019



EXAM PARAMETERS:  NUMBER OF VIEWS: One view.

TECHNIQUE: Single frontal radiographic view of the chest acquired.

RADIATION DOSE: NA

LIMITATIONS: None.



FINDINGS:  LUNGS AND PLEURA: There is now diffuse bilateral alveolar and interstitial infiltrates wor
risome for pulmonary edema.

Small bilateral pleural effusions are now present left-greater-than-right.  No pneumothorax.

MEDIASTINUM AND HILAR STRUCTURES: No masses.  Contour normal.

HEART AND VASCULAR STRUCTURES: Heart normal in size.

BONES: No acute findings.

HARDWARE: Right-sided permanent central line tip superior vena cava.

OTHER: No other significant finding.



IMPRESSION:  Pulmonary edema and pleural effusions,  increased compared to 9/1/2019.



COMMENT:  Report called to Dr. Garcia, 1520 hours 9/3/2019



TECHNICAL DOCUMENTATION:  JOB ID:  [id number]

 2011 [employer]- All Rights Reserved



Reading location - IP/workstation name: [email or web address]

## 2019-09-03 NOTE — PROGRESS NOTE ACKNOWLEDGEMENT
Progress Note Acknowledgement


Progess Note Acknowledgement: 


I, the undersigned member of the medical staff with appropriate privileges and 

with supervisory authority over [Bishop Garcia], a dependent practice allied 

health professional, acknowledge that I have reviewed the progress notes entered

on this patient, and in my professional judgment believe that the assessment 

made and/or any care evidenced was appropriate

## 2019-09-03 NOTE — PDOC PROGRESS REPORT
Subjective


Progress Note for:: 09/03/19


Subjective:: 





9/3/2019-no specific complaints this morning.


Reason For Visit: 


PNEUMONIA








Physical Exam


Vital Signs: 


                                        











Temp Pulse Resp BP Pulse Ox


 


 97.4 F   84   36 H  152/108 H  94 


 


 09/03/19 07:11  09/03/19 07:11  09/03/19 07:11  09/03/19 07:11  09/03/19 07:11








                                 Intake & Output











 09/02/19 09/03/19 09/04/19





 06:59 06:59 06:59


 


Intake Total 1550 3297 


 


Output Total  1053 


 


Balance 1550 2244 


 


Weight 53.6 kg 55.8 kg 











General appearance: PRESENT: no acute distress, well-developed, well-nourished


Neck exam: ABSENT: carotid bruit, JVD, lymphadenopathy, thyromegaly


Respiratory exam: PRESENT: decreased breath sounds, rhonchi, symmetrical, 

unlabored, other - BiPAP in place


Pulses: PRESENT: normal dorsalis pedis pul


Vascular exam: PRESENT: normal capillary refill


GI/Abdominal exam: PRESENT: normal bowel sounds, soft.  ABSENT: distended, 

guarding, mass, organolmegaly, rebound, tenderness


Extremities exam: PRESENT: full ROM.  ABSENT: calf tenderness, clubbing, pedal 

edema


Neurological exam: PRESENT: alert, awake, oriented to person, oriented to place,

oriented to time, oriented to situation, CN II-XII grossly intact.  ABSENT: 

motor sensory deficit


Psychiatric exam: PRESENT: appropriate affect, normal mood.  ABSENT: homicidal 

ideation, suicidal ideation


Skin exam: PRESENT: dry, intact, warm.  ABSENT: cyanosis, rash





Results


Laboratory Results: 


                                        





                                 09/02/19 06:49 





                                 09/02/19 06:49 





                                        











  09/02/19





  10:43


 


Carbonic Acid  1.10


 


HCO3/H2CO3 Ratio  20:1


 


ABG pH  7.41


 


ABG pCO2  36.5


 


ABG pO2  63.3 L


 


ABG HCO3  22.7


 


ABG O2 Saturation  92.6 L


 


ABG Base Excess  -1.5


 


FiO2  40%











Impressions: 


                                        





Chest X-Ray  09/01/19 09:57


IMPRESSION:  DIFFUSE CHRONIC INTERSTITIAL CHANGES.  INCREASING AIRSPACE DISEASE 

IN THE LEFT LUNG BASE MAY REPRESENT DEVELOPING PNEUMONIA.


 














Assessment and Plan





- Diagnosis


(1) Pneumonia


Qualifiers: 


   Pneumonia type: due to unspecified organism   Laterality: left   Lung 

location: lower lobe of lung   Qualified Code(s): J18.1 - Lobar pneumonia, un

specified organism   


Is this a current diagnosis for this admission?: Yes   


Plan: 


Chest x-ray demonstrates left lower lobe pneumonia.


Blood and sputum cultures have no growth to date.


ABG is reassuring; no hypercapnia





The patient is admitted to the medical floor and continuous cardiac telemetry.


She is provided supplemental oxygen.


Discussed with respiratory therapy, will discontinue BiPAP and trial high flow 

nasal cannula


Continue on scheduled and as needed nebulizer treatments.


She is empirically placed on IV Levaquin.  Will adjust as cultures result.


Tessalon Perles and Robitussin as needed.


Incentive spirometer and flutter valve to bedside.





9/3/2019-continues to improve.  Chest x-ray did show a left lower lobe pneumonia

although blood cultures have been negative to date.  Patient remains with 

submental oxygen as needed and will continue BiPAP as needed as well.  She is on

Levaquin.  Will await cultures








(2) Acute respiratory failure with hypoxemia


Is this a current diagnosis for this admission?: Yes   


Plan: 


Secondary to LLL PNA


Pt is a previous long term tobacco user, thin frame.  Noted to have wheezing 

today. 


Possible undiagnosed COPD.





Evaluation and management as above.


In addition, will trial steroid therapy.





9/3/2019-most likely secondary to left lower lobe pneumonia.  Ex-smoker quit 10 

years ago.  Lungs are diminished throughout there is some rhonchi scattered.  

Continue antibiotics and BiPAP as needed








(3) Metastatic breast cancer


Is this a current diagnosis for this admission?: Yes   


Plan: 


Has not yet been started on chemotherapy or immune modifiers.


Consider heme/onc consultation.


Follow up with outpatient Oncologist as scheduled.


Continue outpatient pain management regimen





9/3/2019-continue home antineoplastic medications and pain control.








(4) Anxiety


Is this a current diagnosis for this admission?: Yes   


Plan: 


prn p.o. Ativan





9/3/2019-continue PRN Ativan








- Time


Time Spent with patient: 15-24 minutes





- Inpatient Certification


Based on my medical assessment, after consideration of the patient's 

comorbidities, presenting symptoms, or acuity I expect that the services needed 

warrant INPATIENT care.: Yes


I certify that my determination is in accordance with my understanding of 

Medicare's requirements for reasonable and necessary INPATIENT services [42 CFR 

412.3e].: Yes


Medical Necessity: Other - IV antibiotics, BiPAP

## 2019-09-04 LAB
ALBUMIN SERPL-MCNC: 3.3 G/DL (ref 3.5–5)
ALP SERPL-CCNC: 276 U/L (ref 38–126)
ANION GAP SERPL CALC-SCNC: 12 MMOL/L (ref 5–19)
ANION GAP SERPL CALC-SCNC: 8 MMOL/L (ref 5–19)
ARTERIAL BLOOD FIO2: (no result)
ARTERIAL BLOOD FIO2: (no result)
ARTERIAL BLOOD H2CO3: 1.43 MMOL/L (ref 1.05–1.35)
ARTERIAL BLOOD H2CO3: 1.54 MMOL/L (ref 1.05–1.35)
ARTERIAL BLOOD HCO3: 31.5 MMOL/L (ref 20–24)
ARTERIAL BLOOD HCO3: 33.5 MMOL/L (ref 20–24)
ARTERIAL BLOOD PCO2: 47.4 MMHG (ref 35–45)
ARTERIAL BLOOD PCO2: 51.2 MMHG (ref 35–45)
ARTERIAL BLOOD PH: 7.41 (ref 7.35–7.45)
ARTERIAL BLOOD PH: 7.47 (ref 7.35–7.45)
ARTERIAL BLOOD PO2: 57.7 MMHG (ref 80–100)
ARTERIAL BLOOD PO2: 61.1 MMHG (ref 80–100)
ARTERIAL BLOOD TOTAL CO2: 33.1 MMOL/L (ref 21–25)
ARTERIAL BLOOD TOTAL CO2: 34.9 MMOL/L (ref 21–25)
AST SERPL-CCNC: 63 U/L (ref 14–36)
BASE EXCESS BLDA CALC-SCNC: 5.5 MMOL/L
BASE EXCESS BLDA CALC-SCNC: 8.2 MMOL/L
BILIRUB DIRECT SERPL-MCNC: 0.5 MG/DL (ref 0–0.4)
BILIRUB SERPL-MCNC: 0.6 MG/DL (ref 0.2–1.3)
BUN SERPL-MCNC: 18 MG/DL (ref 7–20)
BUN SERPL-MCNC: 24 MG/DL (ref 7–20)
CALCIUM: 8.9 MG/DL (ref 8.4–10.2)
CALCIUM: 9.2 MG/DL (ref 8.4–10.2)
CHLORIDE SERPL-SCNC: 96 MMOL/L (ref 98–107)
CHLORIDE SERPL-SCNC: 99 MMOL/L (ref 98–107)
CO2 SERPL-SCNC: 29 MMOL/L (ref 22–30)
CO2 SERPL-SCNC: 33 MMOL/L (ref 22–30)
ERYTHROCYTE [DISTWIDTH] IN BLOOD BY AUTOMATED COUNT: 15.3 % (ref 11.5–14)
ERYTHROCYTE [DISTWIDTH] IN BLOOD BY AUTOMATED COUNT: 15.5 % (ref 11.5–14)
GLUCOSE SERPL-MCNC: 116 MG/DL (ref 75–110)
GLUCOSE SERPL-MCNC: 124 MG/DL (ref 75–110)
HCT VFR BLD CALC: 44.3 % (ref 36–47)
HCT VFR BLD CALC: 46 % (ref 36–47)
HGB BLD-MCNC: 14.7 G/DL (ref 12–15.5)
HGB BLD-MCNC: 15.5 G/DL (ref 12–15.5)
MCH RBC QN AUTO: 29.8 PG (ref 27–33.4)
MCH RBC QN AUTO: 29.9 PG (ref 27–33.4)
MCHC RBC AUTO-ENTMCNC: 33.1 G/DL (ref 32–36)
MCHC RBC AUTO-ENTMCNC: 33.6 G/DL (ref 32–36)
MCV RBC AUTO: 89 FL (ref 80–97)
MCV RBC AUTO: 90 FL (ref 80–97)
PLATELET # BLD: 234 10^3/UL (ref 150–450)
PLATELET # BLD: 253 10^3/UL (ref 150–450)
POTASSIUM SERPL-SCNC: 3.2 MMOL/L (ref 3.6–5)
POTASSIUM SERPL-SCNC: 3.6 MMOL/L (ref 3.6–5)
PROT SERPL-MCNC: 6.4 G/DL (ref 6.3–8.2)
RBC # BLD AUTO: 4.93 10^6/UL (ref 3.72–5.28)
RBC # BLD AUTO: 5.16 10^6/UL (ref 3.72–5.28)
SAO2 % BLDA: 91.3 % (ref 94–98)
SAO2 % BLDA: 91.3 % (ref 94–98)
WBC # BLD AUTO: 23.6 10^3/UL (ref 4–10.5)
WBC # BLD AUTO: 27.3 10^3/UL (ref 4–10.5)

## 2019-09-04 RX ADMIN — GABAPENTIN SCH MG: 400 CAPSULE ORAL at 17:38

## 2019-09-04 RX ADMIN — IPRATROPIUM BROMIDE AND ALBUTEROL SULFATE SCH ML: 2.5; .5 SOLUTION RESPIRATORY (INHALATION) at 14:23

## 2019-09-04 RX ADMIN — ZOLPIDEM TARTRATE SCH MG: 5 TABLET ORAL at 22:04

## 2019-09-04 RX ADMIN — HEPARIN SODIUM SCH UNIT: 5000 INJECTION, SOLUTION INTRAVENOUS; SUBCUTANEOUS at 22:05

## 2019-09-04 RX ADMIN — AMLODIPINE BESYLATE SCH MG: 5 TABLET ORAL at 09:55

## 2019-09-04 RX ADMIN — Medication SCH: at 14:51

## 2019-09-04 RX ADMIN — CELECOXIB SCH MG: 200 CAPSULE ORAL at 17:38

## 2019-09-04 RX ADMIN — VANCOMYCIN HYDROCHLORIDE SCH MLS/HR: 1 INJECTION, POWDER, LYOPHILIZED, FOR SOLUTION INTRAVENOUS at 12:07

## 2019-09-04 RX ADMIN — IPRATROPIUM BROMIDE AND ALBUTEROL SULFATE SCH ML: 2.5; .5 SOLUTION RESPIRATORY (INHALATION) at 19:48

## 2019-09-04 RX ADMIN — PIPERACILLIN AND TAZOBACTAM SCH MLS/HR: 3; .375 INJECTION, POWDER, LYOPHILIZED, FOR SOLUTION INTRAVENOUS; PARENTERAL at 21:15

## 2019-09-04 RX ADMIN — PIPERACILLIN AND TAZOBACTAM SCH MLS/HR: 3; .375 INJECTION, POWDER, LYOPHILIZED, FOR SOLUTION INTRAVENOUS; PARENTERAL at 09:55

## 2019-09-04 RX ADMIN — FAMOTIDINE SCH MG: 20 TABLET, FILM COATED ORAL at 22:04

## 2019-09-04 RX ADMIN — ACETAMINOPHEN PRN MG: 325 TABLET ORAL at 08:24

## 2019-09-04 RX ADMIN — FUROSEMIDE SCH MG: 10 INJECTION, SOLUTION INTRAMUSCULAR; INTRAVENOUS at 14:51

## 2019-09-04 RX ADMIN — HYDROCODONE BITARTRATE AND ACETAMINOPHEN PRN TAB: 5; 325 TABLET ORAL at 09:58

## 2019-09-04 RX ADMIN — METHYLPREDNISOLONE SODIUM SUCCINATE SCH MG: 40 INJECTION, POWDER, FOR SOLUTION INTRAMUSCULAR; INTRAVENOUS at 05:18

## 2019-09-04 RX ADMIN — Medication PRN MLS/HR: at 18:50

## 2019-09-04 RX ADMIN — IPRATROPIUM BROMIDE AND ALBUTEROL SULFATE SCH ML: 2.5; .5 SOLUTION RESPIRATORY (INHALATION) at 08:54

## 2019-09-04 RX ADMIN — HEPARIN SODIUM SCH UNIT: 5000 INJECTION, SOLUTION INTRAVENOUS; SUBCUTANEOUS at 05:18

## 2019-09-04 RX ADMIN — FAMOTIDINE SCH MG: 20 TABLET, FILM COATED ORAL at 09:56

## 2019-09-04 RX ADMIN — METHYLPREDNISOLONE SODIUM SUCCINATE SCH MG: 40 INJECTION, POWDER, FOR SOLUTION INTRAMUSCULAR; INTRAVENOUS at 22:06

## 2019-09-04 RX ADMIN — METHYLPREDNISOLONE SODIUM SUCCINATE SCH MG: 40 INJECTION, POWDER, FOR SOLUTION INTRAMUSCULAR; INTRAVENOUS at 14:51

## 2019-09-04 RX ADMIN — GABAPENTIN SCH: 400 CAPSULE ORAL at 05:13

## 2019-09-04 RX ADMIN — HEPARIN SODIUM SCH UNIT: 5000 INJECTION, SOLUTION INTRAVENOUS; SUBCUTANEOUS at 14:51

## 2019-09-04 RX ADMIN — GUAIFENESIN SCH MG: 600 TABLET, EXTENDED RELEASE ORAL at 09:55

## 2019-09-04 RX ADMIN — VANCOMYCIN HYDROCHLORIDE SCH MLS/HR: 1 INJECTION, POWDER, LYOPHILIZED, FOR SOLUTION INTRAVENOUS at 22:06

## 2019-09-04 RX ADMIN — LETROZOLE SCH MG: 2.5 TABLET, FILM COATED ORAL at 09:56

## 2019-09-04 RX ADMIN — POTASSIUM CHLORIDE SCH MLS/HR: 29.8 INJECTION, SOLUTION INTRAVENOUS at 22:48

## 2019-09-04 RX ADMIN — GUAIFENESIN SCH MG: 600 TABLET, EXTENDED RELEASE ORAL at 22:04

## 2019-09-04 RX ADMIN — PIPERACILLIN AND TAZOBACTAM SCH MLS/HR: 3; .375 INJECTION, POWDER, LYOPHILIZED, FOR SOLUTION INTRAVENOUS; PARENTERAL at 14:51

## 2019-09-04 RX ADMIN — GABAPENTIN SCH MG: 400 CAPSULE ORAL at 12:07

## 2019-09-04 RX ADMIN — CELECOXIB SCH MG: 200 CAPSULE ORAL at 09:55

## 2019-09-04 RX ADMIN — Medication SCH: at 05:13

## 2019-09-04 RX ADMIN — IPRATROPIUM BROMIDE AND ALBUTEROL SULFATE SCH ML: 2.5; .5 SOLUTION RESPIRATORY (INHALATION) at 02:01

## 2019-09-04 RX ADMIN — FUROSEMIDE SCH MG: 10 INJECTION, SOLUTION INTRAMUSCULAR; INTRAVENOUS at 22:05

## 2019-09-04 RX ADMIN — Medication SCH: at 22:06

## 2019-09-04 NOTE — RADIOLOGY REPORT (SQ)
EXAM DESCRIPTION:  CHEST SINGLE VIEW



COMPLETED DATE/TIME:  9/4/2019 9:01 am



REASON FOR STUDY:  pulmonary edema



COMPARISON:  9/3/2019



NUMBER OF VIEWS:  One view.



TECHNIQUE:  Single frontal radiographic image of the chest acquired.



LIMITATIONS:  None.



FINDINGS:  LUNGS AND PLEURA: Grossly stable in appearance allowing for slight differences technique.

MEDIASTINUM AND HILAR STRUCTURES: Stable heart size and mediastinal structures.

HEART AND VASCULAR STRUCTURES:  Stable appearance.

BONES: No acute findings.

HARDWARE: [id number] remains in place.

OTHER: No other significant finding.



IMPRESSION:  Grossly stable chest with bilateral interstitial airspace disease and bilateral pleural 
effusions.  Superimposed atelectasis or pneumonia in the left base cannot be excluded.



TECHNICAL DOCUMENTATION:  JOB ID:  [id number]

 2011 [employer]- All Rights Reserved



Reading location - IP/workstation name: [email or web address]

## 2019-09-04 NOTE — PDOC PROGRESS REPORT
Subjective


Progress Note for:: 09/04/19


Subjective:: 





9/3/2019-no specific complaints this morning.





9/4/2019-improved breathing this morning.


Reason For Visit: 


PNEUMONIA








Physical Exam


Vital Signs: 


                                        











Temp Pulse Resp BP Pulse Ox


 


 97.4 F   94   22 H  128/70 H  97 


 


 09/04/19 04:14  09/04/19 04:14  09/04/19 04:27  09/04/19 04:14  09/04/19 04:27








                                 Intake & Output











 09/03/19 09/04/19 09/05/19





 06:59 06:59 06:59


 


Intake Total 3297 150 


 


Output Total 1053 3100 


 


Balance 2244 -2950 


 


Weight 55.8 kg 56.1 kg 











General appearance: PRESENT: no acute distress, well-developed, well-nourished


Neck exam: ABSENT: carotid bruit, JVD, lymphadenopathy, thyromegaly


Respiratory exam: PRESENT: decreased breath sounds, rales, symmetrical, 

tachypnea, unlabored, wheezes


Cardiovascular exam: PRESENT: RRR.  ABSENT: diastolic murmur, rubs, systolic 

murmur


Pulses: PRESENT: normal dorsalis pedis pul


GI/Abdominal exam: PRESENT: normal bowel sounds, soft.  ABSENT: distended, guard

ing, mass, organolmegaly, rebound, tenderness


Rectal exam: PRESENT: deferred


Extremities exam: PRESENT: full ROM.  ABSENT: calf tenderness, clubbing, pedal 

edema


Neurological exam: PRESENT: alert, awake, oriented to person, oriented to place,

oriented to time, oriented to situation, CN II-XII grossly intact.  ABSENT: 

motor sensory deficit


Psychiatric exam: PRESENT: appropriate affect, normal mood.  ABSENT: homicidal 

ideation, suicidal ideation


Skin exam: PRESENT: dry, intact, warm.  ABSENT: cyanosis, rash





Results


Laboratory Results: 


                                        





                                 09/04/19 04:58 





                                 09/04/19 04:58 





                                        











  09/03/19 09/03/19 09/04/19





  14:33 18:10 04:58


 


WBC    23.6 H D


 


RBC    4.93


 


Hgb    14.7


 


Hct    44.3


 


MCV    90


 


MCH    29.8


 


MCHC    33.1


 


RDW    15.5 H


 


Plt Count    234


 


Carbonic Acid  1.80 H  1.77 H 


 


HCO3/H2CO3 Ratio  13:1  15:1 


 


ABG pH  7.24 L  7.28 L 


 


ABG pCO2  59.7 H  58.7 H 


 


ABG pO2  72.9 L  74.1 L 


 


ABG HCO3  25.1 H  26.7 H 


 


ABG O2 Saturation  91.7 L  92.7 L 


 


ABG Base Excess  -3.4  -1.4 


 


FiO2  100%  90% 


 


Sodium   


 


Potassium   


 


Chloride   


 


Carbon Dioxide   


 


Anion Gap   


 


BUN   


 


Creatinine   


 


Est GFR (African Amer)   


 


Glucose   


 


Calcium   














  09/04/19 09/04/19





  04:58 06:24


 


WBC  


 


RBC  


 


Hgb  


 


Hct  


 


MCV  


 


MCH  


 


MCHC  


 


RDW  


 


Plt Count  


 


Carbonic Acid   1.54 H


 


HCO3/H2CO3 Ratio   20:1


 


ABG pH   7.41


 


ABG pCO2   51.2 H


 


ABG pO2   61.1 L


 


ABG HCO3   31.5 H


 


ABG O2 Saturation   91.3 L


 


ABG Base Excess   5.5


 


FiO2   80%


 


Sodium  139.5 


 


Potassium  3.6 


 


Chloride  99 


 


Carbon Dioxide  33 H 


 


Anion Gap  8 


 


BUN  18 


 


Creatinine  0.70 


 


Est GFR (African Amer)  > 60 


 


Glucose  124 H 


 


Calcium  9.2 








                                        





09/02/19 13:20   Clean Catch Midstream   Urine Culture - Final


                            NO GROWTH 2 DAYS


09/01/19 15:15   Sputum   Gram Stain - Final


09/01/19 15:15   Sputum   Sputum Culture - Final


                            NORMAL OSVALDO








Impressions: 


                                        





Chest X-Ray  09/03/19 00:00


IMPRESSION:  Pulmonary edema and pleural effusions,  increased compared to 

9/1/2019.


 














Assessment and Plan





- Diagnosis


(1) Pneumonia


Qualifiers: 


   Pneumonia type: due to unspecified organism   Laterality: left   Lung 

location: lower lobe of lung   Qualified Code(s): J18.1 - Lobar pneumonia, 

unspecified organism   


Is this a current diagnosis for this admission?: Yes   


Plan: 


Chest x-ray demonstrates left lower lobe pneumonia.


Blood and sputum cultures have no growth to date.


ABG is reassuring; no hypercapnia





The patient is admitted to the medical floor and continuous cardiac telemetry.


She is provided supplemental oxygen.


Discussed with respiratory therapy, will discontinue BiPAP and trial high flow 

nasal cannula


Continue on scheduled and as needed nebulizer treatments.


She is empirically placed on IV Levaquin.  Will adjust as cultures result.


Tessalon Perles and Robitussin as needed.


Incentive spirometer and flutter valve to bedside.





9/3/2019-continues to improve.  Chest x-ray did show a left lower lobe pneumonia

although blood cultures have been negative to date.  Patient remains with 

submental oxygen as needed and will continue BiPAP as needed as well.  She is on

Levaquin.  Will await cultures








9/4/2019-white count increased this morning to 24,000.  Chest x-ray still 

suggestive of a left lower lobe pneumonia although "blood cultures been negative

to date.  Patient was on Levaquin I switched her over from Levaquin to 

vancomycin and Zosyn per pharmacy dosing.  We will continue with submental 

oxygen and BiPAP as needed.  Patient was also on Lasix drip overnight as she got

acute flash pulmonary edema and is improved from that tremendously.








(2) Acute respiratory failure with hypoxemia


Is this a current diagnosis for this admission?: Yes   


Plan: 


Secondary to LLL PNA


Pt is a previous long term tobacco user, thin frame.  Noted to have wheezing 

today. 


Possible undiagnosed COPD.





Evaluation and management as above.


In addition, will trial steroid therapy.





9/3/2019-most likely secondary to left lower lobe pneumonia.  Ex-smoker quit 10 

years ago.  Lungs are diminished throughout there is some rhonchi scattered.  Co

ntinue antibiotics and BiPAP as needed





9/4/2019-patient continues on BiPAP.  Patient did get flash pulmonary edema 

yesterday been on a Lasix drip overnight and has diuresed well.  Lungs remain 

with rales although much diminished at this time.  We will continue Lasix 

although will switch from drip to 40 mg IV every 8 hours.  Reassess in the a.m.








(3) Metastatic breast cancer


Is this a current diagnosis for this admission?: Yes   


Plan: 


Has not yet been started on chemotherapy or immune modifiers.


Consider heme/onc consultation.


Follow up with outpatient Oncologist as scheduled.


Continue outpatient pain management regimen





9/3/2019-continue home antineoplastic medications and pain control.





9/4/2019-continue home medications.  Patient will go for outpatient chemotherapy

once discharged.








(4) Anxiety


Is this a current diagnosis for this admission?: Yes   


Plan: 


prn p.o. Ativan





9/3/2019-continue PRN Ativan





9/4/2019-continue PRN Ativan








(5) Flash pulmonary edema


Is this a current diagnosis for this admission?: Yes   


Plan: 


9/4/2019-patient went into severe flash pulmonary edema yesterday afternoon.  

Patient was placed on Lasix drip given IV morphine and topical Nitropaste.  

Patient was diuresed overnight has improved respiratory rate and drive.  ABG is 

improved showing a fully compensated hypercapnic respiratory failure.  Patient 

remains on BiPAP at current settings 16/10 with rate of 10.  We will continue 

Lasix although the Lasix drip has been DC'd I will give her Lasix 40 g IV every 

8.  We will continue to reassess








- Time


Time Spent with patient: 15-24 minutes





- Inpatient Certification


Based on my medical assessment, after consideration of the patient's 

comorbidities, presenting symptoms, or acuity I expect that the services needed 

warrant INPATIENT care.: Yes


I certify that my determination is in accordance with my understanding of 

Medicare's requirements for reasonable and necessary INPATIENT services [42 CFR 

412.3e].: Yes


Medical Necessity: Other - IV Lasix, IV antibiotics.

## 2019-09-05 LAB
ANION GAP SERPL CALC-SCNC: 7 MMOL/L (ref 5–19)
BUN SERPL-MCNC: 32 MG/DL (ref 7–20)
CALCIUM: 8.6 MG/DL (ref 8.4–10.2)
CHLORIDE SERPL-SCNC: 97 MMOL/L (ref 98–107)
CO2 SERPL-SCNC: 33 MMOL/L (ref 22–30)
ERYTHROCYTE [DISTWIDTH] IN BLOOD BY AUTOMATED COUNT: 15.1 % (ref 11.5–14)
GLUCOSE SERPL-MCNC: 124 MG/DL (ref 75–110)
HCT VFR BLD CALC: 40.7 % (ref 36–47)
HGB BLD-MCNC: 13.7 G/DL (ref 12–15.5)
MCH RBC QN AUTO: 29.9 PG (ref 27–33.4)
MCHC RBC AUTO-ENTMCNC: 33.6 G/DL (ref 32–36)
MCV RBC AUTO: 89 FL (ref 80–97)
PLATELET # BLD: 252 10^3/UL (ref 150–450)
POTASSIUM SERPL-SCNC: 3.6 MMOL/L (ref 3.6–5)
RBC # BLD AUTO: 4.58 10^6/UL (ref 3.72–5.28)
WBC # BLD AUTO: 23.3 10^3/UL (ref 4–10.5)

## 2019-09-05 RX ADMIN — PIPERACILLIN AND TAZOBACTAM SCH: 3; .375 INJECTION, POWDER, LYOPHILIZED, FOR SOLUTION INTRAVENOUS; PARENTERAL at 20:04

## 2019-09-05 RX ADMIN — GABAPENTIN SCH MG: 400 CAPSULE ORAL at 20:06

## 2019-09-05 RX ADMIN — METHYLPREDNISOLONE SODIUM SUCCINATE SCH: 40 INJECTION, POWDER, FOR SOLUTION INTRAMUSCULAR; INTRAVENOUS at 20:04

## 2019-09-05 RX ADMIN — PIPERACILLIN AND TAZOBACTAM SCH MLS/HR: 3; .375 INJECTION, POWDER, LYOPHILIZED, FOR SOLUTION INTRAVENOUS; PARENTERAL at 08:05

## 2019-09-05 RX ADMIN — METHYLPREDNISOLONE SODIUM SUCCINATE SCH MG: 40 INJECTION, POWDER, FOR SOLUTION INTRAMUSCULAR; INTRAVENOUS at 05:54

## 2019-09-05 RX ADMIN — VANCOMYCIN HYDROCHLORIDE SCH MLS/HR: 1 INJECTION, POWDER, LYOPHILIZED, FOR SOLUTION INTRAVENOUS at 11:27

## 2019-09-05 RX ADMIN — GABAPENTIN SCH: 400 CAPSULE ORAL at 05:42

## 2019-09-05 RX ADMIN — ZOLPIDEM TARTRATE SCH MG: 5 TABLET ORAL at 22:28

## 2019-09-05 RX ADMIN — PIPERACILLIN AND TAZOBACTAM SCH MLS/HR: 3; .375 INJECTION, POWDER, LYOPHILIZED, FOR SOLUTION INTRAVENOUS; PARENTERAL at 20:44

## 2019-09-05 RX ADMIN — GUAIFENESIN SCH: 600 TABLET, EXTENDED RELEASE ORAL at 11:27

## 2019-09-05 RX ADMIN — POTASSIUM CHLORIDE SCH MLS/HR: 29.8 INJECTION, SOLUTION INTRAVENOUS at 00:05

## 2019-09-05 RX ADMIN — FUROSEMIDE SCH: 10 INJECTION, SOLUTION INTRAMUSCULAR; INTRAVENOUS at 20:49

## 2019-09-05 RX ADMIN — AMLODIPINE BESYLATE SCH MG: 5 TABLET ORAL at 11:27

## 2019-09-05 RX ADMIN — GABAPENTIN SCH: 400 CAPSULE ORAL at 00:05

## 2019-09-05 RX ADMIN — SODIUM CHLORIDE PRN MLS/HR: 9 INJECTION, SOLUTION INTRAVENOUS at 20:44

## 2019-09-05 RX ADMIN — CELECOXIB SCH MG: 200 CAPSULE ORAL at 20:06

## 2019-09-05 RX ADMIN — VANCOMYCIN HYDROCHLORIDE SCH: 1 INJECTION, POWDER, LYOPHILIZED, FOR SOLUTION INTRAVENOUS at 20:48

## 2019-09-05 RX ADMIN — PIPERACILLIN AND TAZOBACTAM SCH MLS/HR: 3; .375 INJECTION, POWDER, LYOPHILIZED, FOR SOLUTION INTRAVENOUS; PARENTERAL at 02:11

## 2019-09-05 RX ADMIN — METHYLPREDNISOLONE SODIUM SUCCINATE SCH MG: 40 INJECTION, POWDER, FOR SOLUTION INTRAMUSCULAR; INTRAVENOUS at 22:28

## 2019-09-05 RX ADMIN — Medication PRN MLS/HR: at 03:24

## 2019-09-05 RX ADMIN — FAMOTIDINE SCH MG: 20 TABLET, FILM COATED ORAL at 22:28

## 2019-09-05 RX ADMIN — FAMOTIDINE SCH: 20 TABLET, FILM COATED ORAL at 11:26

## 2019-09-05 RX ADMIN — Medication SCH: at 20:04

## 2019-09-05 RX ADMIN — IPRATROPIUM BROMIDE AND ALBUTEROL SULFATE SCH ML: 2.5; .5 SOLUTION RESPIRATORY (INHALATION) at 08:20

## 2019-09-05 RX ADMIN — HEPARIN SODIUM SCH UNIT: 5000 INJECTION, SOLUTION INTRAVENOUS; SUBCUTANEOUS at 05:54

## 2019-09-05 RX ADMIN — IPRATROPIUM BROMIDE AND ALBUTEROL SULFATE SCH ML: 2.5; .5 SOLUTION RESPIRATORY (INHALATION) at 02:13

## 2019-09-05 RX ADMIN — CELECOXIB SCH MG: 200 CAPSULE ORAL at 11:24

## 2019-09-05 RX ADMIN — GUAIFENESIN SCH MG: 600 TABLET, EXTENDED RELEASE ORAL at 22:28

## 2019-09-05 RX ADMIN — METOPROLOL TARTRATE SCH MG: 25 TABLET, FILM COATED ORAL at 22:29

## 2019-09-05 RX ADMIN — HEPARIN SODIUM SCH: 5000 INJECTION, SOLUTION INTRAVENOUS; SUBCUTANEOUS at 20:49

## 2019-09-05 RX ADMIN — MORPHINE SULFATE PRN MG: 10 INJECTION INTRAMUSCULAR; INTRAVENOUS; SUBCUTANEOUS at 20:05

## 2019-09-05 RX ADMIN — LETROZOLE SCH MG: 2.5 TABLET, FILM COATED ORAL at 11:24

## 2019-09-05 RX ADMIN — GABAPENTIN SCH MG: 400 CAPSULE ORAL at 11:26

## 2019-09-05 RX ADMIN — FUROSEMIDE SCH MG: 10 INJECTION, SOLUTION INTRAMUSCULAR; INTRAVENOUS at 05:54

## 2019-09-05 RX ADMIN — FUROSEMIDE SCH MG: 10 INJECTION, SOLUTION INTRAMUSCULAR; INTRAVENOUS at 22:29

## 2019-09-05 RX ADMIN — Medication SCH ML: at 22:29

## 2019-09-05 RX ADMIN — IPRATROPIUM BROMIDE AND ALBUTEROL SULFATE SCH ML: 2.5; .5 SOLUTION RESPIRATORY (INHALATION) at 20:53

## 2019-09-05 RX ADMIN — IPRATROPIUM BROMIDE AND ALBUTEROL SULFATE SCH ML: 2.5; .5 SOLUTION RESPIRATORY (INHALATION) at 13:52

## 2019-09-05 RX ADMIN — DIGOXIN SCH MG: 0.25 TABLET ORAL at 20:09

## 2019-09-05 RX ADMIN — Medication SCH: at 05:41

## 2019-09-05 RX ADMIN — LORAZEPAM PRN MG: 0.5 TABLET ORAL at 22:28

## 2019-09-05 NOTE — PROGRESS NOTE
Provider Note


Provider Note: 





9/4/2019-called patient room patient's heart rate 180s.  Upon entering room 

patient in A. fib with RVR, tachypneic at 40-50 on BiPAP.  At that time I gave 

patient a total of 20 mg IV bolus of Cardizem followed by 10 mg Cardizem drip 

and 0.5 mg of IV digoxin.  I also gave patient morphine 2 mg IV for anxiety and 

breathing discomfort.  I obtained a CBC, CMP, magnesium level, troponin, EKG, 

ABG.  I reported this all off a change of shift to Paul Weiling for further 

follow up.  Patient was transferred to ICU for closer monitoring.

## 2019-09-05 NOTE — EKG REPORT
SEVERITY:- ABNORMAL ECG -

ATRIAL FIBRILLATION WITH RAPID V-RATE

REPOLARIZATION ABNORMALITY, PROB RATE RELATED

:

Confirmed by: Ivy Howell 05-Sep-2019 14:13:51

## 2019-09-05 NOTE — PDOC PROGRESS REPORT
Subjective


Progress Note for:: 09/05/19


Subjective:: 


The patient was transferred to the ICU earlier today.  She was experiencing 

atrial fibrillation with rapid ventricular response causing hypotension.  She 

also has acute hypoxic respiratory failure.


Reason For Visit: 


PNEUMONIA


Atrial fibrillation with rapid ventricular response, acute hypoxic respiratory 

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





Physical Exam


Vital Signs: 


                                        











Temp Pulse Resp BP Pulse Ox


 


 97.8 F   78   20   115/61   94 


 


 09/05/19 18:00  09/05/19 13:52  09/05/19 15:45  09/05/19 05:05  09/05/19 15:45








                                 Intake & Output











 09/04/19 09/05/19 09/06/19





 06:59 06:59 06:59


 


Intake Total 150 1303 84


 


Output Total 3100 3000 1850


 


Balance -3473 -3751 -7544


 


Weight 56.1 kg 57.2 kg 











General appearance: PRESENT: cooperative, well-developed, other - Patient is on 

BiPAP.  She exhibits mild to moderate distress.  Slightly difficult to 

understand due to the BiPAP mask.


Head exam: PRESENT: atraumatic, normocephalic


Ear exam: PRESENT: normal external ear exam.  ABSENT: bleeding, drainage


Mouth exam: PRESENT: other - BiPAP mask in place


Respiratory exam: PRESENT: accessory muscle use, rales - Bilaterally, stridor - 

Slight expiratory stridor, symmetrical, tachypnea


Cardiovascular exam: PRESENT: RRR, +S1, +S2


GI/Abdominal exam: PRESENT: diminished bowel sounds, soft, tenderness - To deep 

palpation upper abdomen.  ABSENT: guarding


Gentrourinary exam: PRESENT: other - Pink-colored urine


Extremities exam: ABSENT: joint swelling, pedal edema


Musculoskeletal exam: PRESENT: normal inspection


Neurological exam: PRESENT: alert, awake, oriented to person, oriented to place,

oriented to time, oriented to situation, CN II-XII grossly intact


Psychiatric exam: PRESENT: appropriate affect.  ABSENT: agitated, anxious


Focused psych exam: ABSENT: delusional, restlessness


Skin exam: PRESENT: dry, warm.  ABSENT: rash





Results


Laboratory Results: 


                                        





                                 09/05/19 04:55 





                                 09/05/19 04:55 





                                        











  09/04/19 09/04/19 09/05/19





  18:30 18:45 04:55


 


WBC   27.3 H  23.3 H


 


RBC   5.16  4.58


 


Hgb   15.5  13.7


 


Hct   46.0  40.7


 


MCV   89  89


 


MCH   29.9  29.9


 


MCHC   33.6  33.6


 


RDW   15.3 H  15.1 H


 


Plt Count   253  252


 


Carbonic Acid  1.43 H  


 


HCO3/H2CO3 Ratio  23:1  


 


ABG pH  7.47 H  


 


ABG pCO2  47.4 H  


 


ABG pO2  57.7 L  


 


ABG HCO3  33.5 H  


 


ABG O2 Saturation  91.3 L  


 


ABG Base Excess  8.2  


 


FiO2  80%  


 


Sodium   


 


Potassium   


 


Chloride   


 


Carbon Dioxide   


 


Anion Gap   


 


BUN   


 


Creatinine   


 


Est GFR (African Amer)   


 


Glucose   


 


Calcium   














  09/05/19





  04:55


 


WBC 


 


RBC 


 


Hgb 


 


Hct 


 


MCV 


 


MCH 


 


MCHC 


 


RDW 


 


Plt Count 


 


Carbonic Acid 


 


HCO3/H2CO3 Ratio 


 


ABG pH 


 


ABG pCO2 


 


ABG pO2 


 


ABG HCO3 


 


ABG O2 Saturation 


 


ABG Base Excess 


 


FiO2 


 


Sodium  137.4


 


Potassium  3.6


 


Chloride  97 L


 


Carbon Dioxide  33 H


 


Anion Gap  7


 


BUN  32 H


 


Creatinine  0.93


 


Est GFR (African Amer)  > 60


 


Glucose  124 H


 


Calcium  8.6








                                        











  09/04/19





  18:45


 


Troponin I  0.730











Impressions: 


                                        





Chest X-Ray  09/04/19 07:00


IMPRESSION:  Grossly stable chest with bilateral interstitial airspace disease 

and bilateral pleural effusions.  Superimposed atelectasis or pneumonia in the 

left base cannot be excluded.


 














Assessment and Plan





- Diagnosis


(1) Pneumonia


Qualifiers: 


   Pneumonia type: due to unspecified organism   Laterality: left   Lung 

location: lower lobe of lung   Qualified Code(s): J18.1 - Lobar pneumonia, 

unspecified organism   


Is this a current diagnosis for this admission?: Yes   


Plan: 


9/5/2019-the patient presented with a left lower lobe pneumonia.  She was 

initially on levofloxacin.  Due to increasing white blood cell count and 

deteriorating clinical condition she was changed to Zosyn and vancomycin.  She 

is currently on BiPAP.  Earlier today she required 100% FiO2 to keep her oxygen 

saturation greater than 90%.  She has nebulizer treatments available.  We will 

continue to monitor white blood cell count and chest x-ray.








(2) Acute respiratory failure with hypoxemia


Is this a current diagnosis for this admission?: Yes   


Plan: 


9/5/2019-respiratory failure secondary to pulmonary edema and underlying 

pneumonia.  X-ray also suggests pulmonary fibrosis.  We will try and taper the 

BiPAP back to high flow nasal cannula.  Combination of antibiotics and diuretics

should help.  In addition now that she is back in sinus rhythm we will likely be

able to reduce the pulmonary edema.  In addition small dose of morphine sulfate 

are available to help decrease air hunger and relax the breathing.








(3) Metastatic breast cancer


Is this a current diagnosis for this admission?: Yes   


Plan: 


9/5/2019-the patient is recently diagnosed with metastatic breast cancer.  She 

currently has metastatic disease to the liver and the spine.  She recently had a

Port-A-Cath put in place.  She has had to begin chemotherapy.








(4) Flash pulmonary edema


Is this a current diagnosis for this admission?: Yes   


Plan: 


9/5/2019-pulmonary edema likely secondary to the atrial fibrillation and 

possibly pneumonia.  With diuretics the patient has an approximately net 

negative fluid balance of 5 L.  As her BUN is beginning to climb we have reduce 

the intravenous fluid and I am going to decrease the furosemide to 40 mg IV 

twice daily from every 8 hours.  Continue to monitor renal function, 

electrolytes and intake and output.








(5) Anxiety


Is this a current diagnosis for this admission?: Yes   


Plan: 


9/5/2019-in addition to antianxiety medications, small doses of morphine will 

help relax the breathing.








(6) Atrial fibrillation with RVR


Is this a current diagnosis for this admission?: Yes   


Plan: 


9/5/2019-the patient went into rapid atrial fibrillation.  She was placed on 

diltiazem infusion.  This failed to control or convert the patient.  

Subsequently she was given several doses of intravenous digoxin.  This in fact 

was successful in converting the patient to sinus rhythm.  We will continue the 

digoxin and add low-dose metoprolol.  Consider echocardiogram when patient is 

stable.  The patient is having hematuria and so no anticoagulation at this time.








(7) Hematuria


Qualifiers: 


   Hematuria type: unspecified type   Qualified Code(s): R31.9 - Hematuria, 

unspecified   


Is this a current diagnosis for this admission?: Yes   


Plan: 


9/5/2019-we will discontinue anticoagulation.  We will continue to monitor 

patient's urine.  When hematuria clears consider anticoagulation although this 

may not be necessary if the patient remains in sinus rhythm.  With an underlying

malignancy she might be prone to hypercoagulability.  We will continue to 

monitor.








- Time


Total Critical Time (Minutes): 50


Medications reviewed and adjusted accordingly: Yes

## 2019-09-06 LAB
ABSOLUTE LYMPHOCYTES# (MANUAL): 0.4 10^3/UL (ref 0.5–4.7)
ABSOLUTE MONOCYTES # (MANUAL): 0.3 10^3/UL (ref 0.1–1.4)
ADD MANUAL DIFF: YES
ALBUMIN SERPL-MCNC: 2.8 G/DL (ref 3.5–5)
ALP SERPL-CCNC: 199 U/L (ref 38–126)
ANION GAP SERPL CALC-SCNC: 7 MMOL/L (ref 5–19)
ANION GAP SERPL CALC-SCNC: 7 MMOL/L (ref 5–19)
ANISOCYTOSIS BLD QL SMEAR: SLIGHT
APTT BLD: 26.1 SEC (ref 23.5–35.8)
AST SERPL-CCNC: 47 U/L (ref 14–36)
BASOPHILS NFR BLD MANUAL: 0 % (ref 0–2)
BILIRUB DIRECT SERPL-MCNC: 0.4 MG/DL (ref 0–0.4)
BILIRUB SERPL-MCNC: 0.5 MG/DL (ref 0.2–1.3)
BUN SERPL-MCNC: 40 MG/DL (ref 7–20)
BUN SERPL-MCNC: 45 MG/DL (ref 7–20)
CALCIUM: 8.4 MG/DL (ref 8.4–10.2)
CALCIUM: 8.6 MG/DL (ref 8.4–10.2)
CHLORIDE SERPL-SCNC: 95 MMOL/L (ref 98–107)
CHLORIDE SERPL-SCNC: 97 MMOL/L (ref 98–107)
CO2 SERPL-SCNC: 37 MMOL/L (ref 22–30)
CO2 SERPL-SCNC: 38 MMOL/L (ref 22–30)
DIGOXIN SERPL-MCNC: 1.43 NG/ML (ref 0.8–2)
EOSINOPHIL NFR BLD MANUAL: 0 % (ref 0–6)
ERYTHROCYTE [DISTWIDTH] IN BLOOD BY AUTOMATED COUNT: 14.8 % (ref 11.5–14)
GLUCOSE SERPL-MCNC: 164 MG/DL (ref 75–110)
GLUCOSE SERPL-MCNC: 165 MG/DL (ref 75–110)
HCT VFR BLD CALC: 42 % (ref 36–47)
HGB BLD-MCNC: 13.6 G/DL (ref 12–15.5)
INR PPP: 1.01
MCH RBC QN AUTO: 29.1 PG (ref 27–33.4)
MCHC RBC AUTO-ENTMCNC: 32.5 G/DL (ref 32–36)
MCV RBC AUTO: 90 FL (ref 80–97)
MONOCYTES % (MANUAL): 2 % (ref 3–13)
PLATELET # BLD: 262 10^3/UL (ref 150–450)
PLATELET COMMENT: ADEQUATE
POIKILOCYTOSIS BLD QL SMEAR: SLIGHT
POTASSIUM SERPL-SCNC: 3.2 MMOL/L (ref 3.6–5)
POTASSIUM SERPL-SCNC: 3.3 MMOL/L (ref 3.6–5)
PROT SERPL-MCNC: 5.8 G/DL (ref 6.3–8.2)
PROTHROMBIN TIME: 13.4 SEC (ref 11.4–15.4)
RBC # BLD AUTO: 4.69 10^6/UL (ref 3.72–5.28)
SEGMENTED NEUTROPHILS % (MAN): 95 % (ref 42–78)
TARGETS BLD QL SMEAR: SLIGHT
TOTAL CELLS COUNTED BLD: 100
VANCOMYCIN,TROUGH: 7.8 UG/ML (ref 5–20)
VARIANT LYMPHS NFR BLD MANUAL: 3 % (ref 13–45)
WBC # BLD AUTO: 14.8 10^3/UL (ref 4–10.5)
WBC TOXIC VACUOLES BLD QL SMEAR: PRESENT

## 2019-09-06 RX ADMIN — GABAPENTIN SCH MG: 400 CAPSULE ORAL at 18:28

## 2019-09-06 RX ADMIN — Medication SCH ML: at 05:44

## 2019-09-06 RX ADMIN — GUAIFENESIN SCH MG: 600 TABLET, EXTENDED RELEASE ORAL at 12:06

## 2019-09-06 RX ADMIN — GABAPENTIN SCH MG: 400 CAPSULE ORAL at 12:13

## 2019-09-06 RX ADMIN — IPRATROPIUM BROMIDE AND ALBUTEROL SULFATE SCH ML: 2.5; .5 SOLUTION RESPIRATORY (INHALATION) at 02:32

## 2019-09-06 RX ADMIN — GUAIFENESIN SCH MG: 600 TABLET, EXTENDED RELEASE ORAL at 21:06

## 2019-09-06 RX ADMIN — FAMOTIDINE SCH MG: 20 TABLET, FILM COATED ORAL at 21:06

## 2019-09-06 RX ADMIN — HYDROCODONE BITARTRATE AND ACETAMINOPHEN PRN TAB: 5; 325 TABLET ORAL at 21:05

## 2019-09-06 RX ADMIN — VANCOMYCIN HYDROCHLORIDE SCH MLS/HR: 1 INJECTION, POWDER, LYOPHILIZED, FOR SOLUTION INTRAVENOUS at 12:29

## 2019-09-06 RX ADMIN — LORAZEPAM PRN MG: 0.5 TABLET ORAL at 21:06

## 2019-09-06 RX ADMIN — PIPERACILLIN AND TAZOBACTAM SCH MLS/HR: 3; .375 INJECTION, POWDER, LYOPHILIZED, FOR SOLUTION INTRAVENOUS; PARENTERAL at 09:52

## 2019-09-06 RX ADMIN — IPRATROPIUM BROMIDE AND ALBUTEROL SULFATE SCH ML: 2.5; .5 SOLUTION RESPIRATORY (INHALATION) at 08:11

## 2019-09-06 RX ADMIN — AMLODIPINE BESYLATE SCH MG: 5 TABLET ORAL at 12:07

## 2019-09-06 RX ADMIN — LETROZOLE SCH MG: 2.5 TABLET, FILM COATED ORAL at 12:08

## 2019-09-06 RX ADMIN — METHYLPREDNISOLONE SODIUM SUCCINATE SCH MG: 40 INJECTION, POWDER, FOR SOLUTION INTRAMUSCULAR; INTRAVENOUS at 21:09

## 2019-09-06 RX ADMIN — DIGOXIN SCH MG: 0.25 TABLET ORAL at 12:06

## 2019-09-06 RX ADMIN — PIPERACILLIN AND TAZOBACTAM SCH MLS/HR: 3; .375 INJECTION, POWDER, LYOPHILIZED, FOR SOLUTION INTRAVENOUS; PARENTERAL at 02:12

## 2019-09-06 RX ADMIN — SODIUM CHLORIDE PRN MLS/HR: 9 INJECTION, SOLUTION INTRAVENOUS at 21:06

## 2019-09-06 RX ADMIN — GABAPENTIN SCH MG: 400 CAPSULE ORAL at 00:02

## 2019-09-06 RX ADMIN — CELECOXIB SCH MG: 200 CAPSULE ORAL at 12:07

## 2019-09-06 RX ADMIN — POTASSIUM CHLORIDE SCH MLS/HR: 29.8 INJECTION, SOLUTION INTRAVENOUS at 05:44

## 2019-09-06 RX ADMIN — MORPHINE SULFATE PRN MG: 10 INJECTION INTRAMUSCULAR; INTRAVENOUS; SUBCUTANEOUS at 00:02

## 2019-09-06 RX ADMIN — CELECOXIB SCH MG: 200 CAPSULE ORAL at 18:28

## 2019-09-06 RX ADMIN — METOPROLOL TARTRATE SCH MG: 25 TABLET, FILM COATED ORAL at 12:06

## 2019-09-06 RX ADMIN — FAMOTIDINE SCH MG: 20 TABLET, FILM COATED ORAL at 12:06

## 2019-09-06 RX ADMIN — POTASSIUM CHLORIDE SCH MLS/HR: 29.8 INJECTION, SOLUTION INTRAVENOUS at 08:11

## 2019-09-06 RX ADMIN — ZOLPIDEM TARTRATE SCH MG: 5 TABLET ORAL at 21:05

## 2019-09-06 RX ADMIN — GABAPENTIN SCH: 400 CAPSULE ORAL at 05:44

## 2019-09-06 RX ADMIN — IPRATROPIUM BROMIDE AND ALBUTEROL SULFATE SCH ML: 2.5; .5 SOLUTION RESPIRATORY (INHALATION) at 21:10

## 2019-09-06 RX ADMIN — METOPROLOL TARTRATE SCH MG: 25 TABLET, FILM COATED ORAL at 21:06

## 2019-09-06 RX ADMIN — FUROSEMIDE SCH MG: 10 INJECTION, SOLUTION INTRAMUSCULAR; INTRAVENOUS at 21:04

## 2019-09-06 RX ADMIN — METHYLPREDNISOLONE SODIUM SUCCINATE SCH MG: 40 INJECTION, POWDER, FOR SOLUTION INTRAMUSCULAR; INTRAVENOUS at 05:44

## 2019-09-06 RX ADMIN — FUROSEMIDE SCH MG: 10 INJECTION, SOLUTION INTRAMUSCULAR; INTRAVENOUS at 09:57

## 2019-09-06 RX ADMIN — IPRATROPIUM BROMIDE AND ALBUTEROL SULFATE SCH ML: 2.5; .5 SOLUTION RESPIRATORY (INHALATION) at 14:00

## 2019-09-06 RX ADMIN — PIPERACILLIN AND TAZOBACTAM SCH MLS/HR: 3; .375 INJECTION, POWDER, LYOPHILIZED, FOR SOLUTION INTRAVENOUS; PARENTERAL at 16:56

## 2019-09-06 RX ADMIN — Medication SCH ML: at 21:07

## 2019-09-06 RX ADMIN — PIPERACILLIN AND TAZOBACTAM SCH MLS/HR: 3; .375 INJECTION, POWDER, LYOPHILIZED, FOR SOLUTION INTRAVENOUS; PARENTERAL at 21:04

## 2019-09-06 RX ADMIN — METHYLPREDNISOLONE SODIUM SUCCINATE SCH MG: 40 INJECTION, POWDER, FOR SOLUTION INTRAMUSCULAR; INTRAVENOUS at 16:57

## 2019-09-06 RX ADMIN — Medication SCH: at 16:56

## 2019-09-06 NOTE — PDOC PROGRESS REPORT
Subjective


Progress Note for:: 09/06/19


Subjective:: 


The patient is still on BiPAP but the FiO2 is down to 65%.


Reason For Visit: 


PNEUMONIA








Physical Exam


Vital Signs: 


                                        











Temp Pulse Resp BP Pulse Ox


 


 96.8 F L  67   14   136/83 H  98 


 


 09/06/19 14:00  09/06/19 14:01  09/06/19 16:00  09/06/19 13:06  09/06/19 16:00








                                 Intake & Output











 09/05/19 09/06/19 09/07/19





 06:59 06:59 06:59


 


Intake Total 1303 1075 150


 


Output Total 3000 9505 1150


 


Balance -1697 -1640 -1000


 


Weight 57.2 kg 62.1 kg 











General appearance: PRESENT: no acute distress, cooperative


Head exam: PRESENT: atraumatic, normocephalic


Ear exam: PRESENT: normal external ear exam.  ABSENT: bleeding, drainage


Mouth exam: PRESENT: other - BiPAP mask in place


Respiratory exam: PRESENT: rales - Bilateral, symmetrical, unlabored.  ABSENT: 

chest wall tenderness, rhonchi, tachypnea, wheezes


Cardiovascular exam: PRESENT: RRR, +S1, +S2


GI/Abdominal exam: PRESENT: normal bowel sounds, soft.  ABSENT: distended, 

tenderness


Rectal exam: PRESENT: deferred


Extremities exam: ABSENT: joint swelling, pedal edema


Musculoskeletal exam: PRESENT: normal inspection.  ABSENT: deformity, 

dislocation


Neurological exam: PRESENT: alert, awake, oriented to time, oriented to 

situation, CN II-XII grossly intact


Psychiatric exam: PRESENT: flat affect.  ABSENT: agitated, anxious


Focused psych exam: ABSENT: delusional, restlessness


Skin exam: PRESENT: dry, warm.  ABSENT: rash





Results


Laboratory Results: 


                                        





                                 09/06/19 04:31 





                                 09/06/19 17:20 





                                        











  09/06/19 09/06/19 09/06/19





  04:31 04:31 12:16


 


WBC  14.8 H  


 


RBC  4.69  


 


Hgb  13.6  


 


Hct  42.0  


 


MCV  90  


 


MCH  29.1  


 


MCHC  32.5  


 


RDW  14.8 H  


 


Plt Count  262  


 


Seg Neutrophils %  Not Reportable  


 


Sodium   139.9 


 


Potassium   3.2 L 


 


Chloride   95 L 


 


Carbon Dioxide   38 H 


 


Anion Gap   7 


 


BUN   40 H 


 


Creatinine   1.06  1.03


 


Est GFR ( Amer)   > 60  > 60


 


Glucose   165 H 


 


Calcium   8.6 


 


Magnesium   2.2 


 


Total Bilirubin   0.5 


 


AST   47 H 


 


Alkaline Phosphatase   199 H 


 


Total Protein   5.8 L 


 


Albumin   2.8 L 














  09/06/19





  17:20


 


WBC 


 


RBC 


 


Hgb 


 


Hct 


 


MCV 


 


MCH 


 


MCHC 


 


RDW 


 


Plt Count 


 


Seg Neutrophils % 


 


Sodium  140.6


 


Potassium  3.3 L


 


Chloride  97 L


 


Carbon Dioxide  37 H


 


Anion Gap  7


 


BUN  45 H


 


Creatinine  0.89


 


Est GFR (African Amer)  > 60


 


Glucose  164 H


 


Calcium  8.4


 


Magnesium 


 


Total Bilirubin 


 


AST 


 


Alkaline Phosphatase 


 


Total Protein 


 


Albumin 








                                        





09/01/19 14:30   Blood   Blood Culture - Final


                            NO GROWTH IN 5 DAYS


09/01/19 10:09   Blood   Blood Culture - Final


                            NO GROWTH IN 5 DAYS





                                        











  09/04/19





  18:45


 


Troponin I  0.730











Impressions: 


                                        





Chest X-Ray  09/06/19 10:34


IMPRESSION:  Grossly stable appearance of the chest.  Persistent diffuse 

bilateral airspace disease most marked in the lung bases.  Small effusions.


 














Assessment and Plan





- Diagnosis


(1) Pneumonia


Qualifiers: 


   Pneumonia type: due to unspecified organism   Laterality: left   Lung 

location: lower lobe of lung   Qualified Code(s): J18.1 - Lobar pneumonia, 

unspecified organism   


Is this a current diagnosis for this admission?: Yes   


Plan: 


9/5/2019-the patient presented with a left lower lobe pneumonia.  She was 

initially on levofloxacin.  Due to increasing white blood cell count and 

deteriorating clinical condition she was changed to Zosyn and vancomycin.  She 

is currently on BiPAP.  Earlier today she required 100% FiO2 to keep her oxygen 

saturation greater than 90%.  She has nebulizer treatments available.  We will 

continue to monitor white blood cell count and chest x-ray.


9/6/2019-we will continue to try and wean the BiPAP.  She is down to 65%.  We 

will continue to administer antibiotics.  We will continue to follow.








(2) Acute respiratory failure with hypoxemia


Is this a current diagnosis for this admission?: Yes   


Plan: 


9/5/2019-respiratory failure secondary to pulmonary edema and underlying 

pneumonia.  X-ray also suggests pulmonary fibrosis.  We will try and taper the 

BiPAP back to high flow nasal cannula.  Combination of antibiotics and diuretics

should help.  In addition now that she is back in sinus rhythm we will likely be

able to reduce the pulmonary edema.  In addition small dose of morphine sulfate 

are available to help decrease air hunger and relax the breathing.


9/6/2019-she still requires significant support by way of BiPAP.  We are going 

to try small windows of high flow nasal cannula.








(3) Metastatic breast cancer


Is this a current diagnosis for this admission?: Yes   


Plan: 


9/5/2019-the patient is recently diagnosed with metastatic breast cancer.  She 

currently has metastatic disease to the liver and the spine.  She recently had a

Port-A-Cath put in place.  She has had to begin chemotherapy.


9/6/2019-still causing pain.  Will control pain.  Decisions about chemotherapy 

depending on her recovery from this episode.








(4) Flash pulmonary edema


Is this a current diagnosis for this admission?: Yes   


Plan: 


9/5/2019-pulmonary edema likely secondary to the atrial fibrillation and 

possibly pneumonia.  With diuretics the patient has an approximately net 

negative fluid balance of 5 L.  As her BUN is beginning to climb we have reduce 

the intravenous fluid and I am going to decrease the furosemide to 40 mg IV 

twice daily from every 8 hours.  Continue to monitor renal function, 

electrolytes and intake and output.


9/6/2019-we continued have a negative fluid balance.  This is even with 

decreasing her furosemide slightly.  Continue current regimen.








(5) Anxiety


Is this a current diagnosis for this admission?: Yes   


Plan: 


9/5/2019-in addition to antianxiety medications, small doses of morphine will 

help relax the breathing.


9/6/2019-continue current regimen.  He does seem relaxed.








(6) Atrial fibrillation with RVR


Is this a current diagnosis for this admission?: Yes   


Plan: 


9/5/2019-the patient went into rapid atrial fibrillation.  She was placed on 

diltiazem infusion.  This failed to control or convert the patient.  

Subsequently she was given several doses of intravenous digoxin.  This in fact 

was successful in converting the patient to sinus rhythm.  We will continue the 

digoxin and add low-dose metoprolol.  Consider echocardiogram when patient is 

stable.  The patient is having hematuria and so no anticoagulation at this time.


9/6/2019-back in sinus rhythm.  Continue to monitor.








(7) Hematuria


Qualifiers: 


   Hematuria type: unspecified type   Qualified Code(s): R31.9 - Hematuria, 

unspecified   


Is this a current diagnosis for this admission?: Yes   


Plan: 


9/5/2019-we will discontinue anticoagulation.  We will continue to monitor 

patient's urine.  When hematuria clears consider anticoagulation although this 

may not be necessary if the patient remains in sinus rhythm.  With an underlying

malignancy she might be prone to hypercoagulability.  We will continue to 

monitor.


9/6/2019-holding anticoagulation.  Continue to monitor urine.








- Time


Time Spent with patient: 15-24 minutes


Medications reviewed and adjusted accordingly: Yes





- Plan Summary


Plan Summary: 


Patient is still BiPAP dependent and can spend 5 to 10 minutes on high flow 

nasal cannula.  Continue to encourage Ensure shakes and small bits of food 

multiple times per day.  This is instead of prolonged time off of BiPAP for a 

larger meal.  Due to underlying metastatic disease prognosis is limited.  The 

patient did receive external beam radiation for her left breast and this is 

consistent with the fine rales likely from fibrosis.

## 2019-09-06 NOTE — RADIOLOGY REPORT (SQ)
EXAM DESCRIPTION:  CHEST SINGLE VIEW



COMPLETED DATE/TIME:  9/6/2019 11:18 am



REASON FOR STUDY:  PNA



COMPARISON:  9/4/2019



NUMBER OF VIEWS:  One view.



TECHNIQUE:  Single frontal radiographic image of the chest acquired.



LIMITATIONS:  None.



FINDINGS:  LUNGS AND PLEURA: Stable appearance.

MEDIASTINUM AND HILAR STRUCTURES: Stable heart size and mediastinal structures.

HEART AND VASCULAR STRUCTURES:  Stable appearance.

BONES: No acute findings.

HARDWARE: [id number] remains in place.

OTHER: No other significant finding.



IMPRESSION:  Grossly stable appearance of the chest.  Persistent diffuse bilateral airspace disease m
ost marked in the lung bases.  Small effusions.



TECHNICAL DOCUMENTATION:  JOB ID:  [id number]

 2011 [employer]- All Rights Reserved



Reading location - IP/workstation name: [email or web address]

## 2019-09-07 LAB
ANION GAP SERPL CALC-SCNC: 7 MMOL/L (ref 5–19)
APTT BLD: 23.8 SEC (ref 23.5–35.8)
ARTERIAL BLOOD H2CO3: 1.36 MMOL/L (ref 1.05–1.35)
ARTERIAL BLOOD HCO3: 30.7 MMOL/L (ref 20–24)
ARTERIAL BLOOD PCO2: 45.2 MMHG (ref 35–45)
ARTERIAL BLOOD PH: 7.45 (ref 7.35–7.45)
ARTERIAL BLOOD PO2: 50.1 MMHG (ref 80–100)
ARTERIAL BLOOD TOTAL CO2: 32.1 MMOL/L (ref 21–25)
BASE EXCESS BLDA CALC-SCNC: 5.8 MMOL/L
BUN SERPL-MCNC: 46 MG/DL (ref 7–20)
CALCIUM: 8.4 MG/DL (ref 8.4–10.2)
CHLORIDE SERPL-SCNC: 102 MMOL/L (ref 98–107)
CO2 SERPL-SCNC: 35 MMOL/L (ref 22–30)
D DIMER PPP FEU-MCNC: 2.38 UG/ML (ref 0–0.5)
ERYTHROCYTE [DISTWIDTH] IN BLOOD BY AUTOMATED COUNT: 14.9 % (ref 11.5–14)
GLUCOSE SERPL-MCNC: 123 MG/DL (ref 75–110)
HCT VFR BLD CALC: 41.9 % (ref 36–47)
HGB BLD-MCNC: 13.5 G/DL (ref 12–15.5)
INR PPP: 1.02
MCH RBC QN AUTO: 29.2 PG (ref 27–33.4)
MCHC RBC AUTO-ENTMCNC: 32.3 G/DL (ref 32–36)
MCV RBC AUTO: 90 FL (ref 80–97)
PLATELET # BLD: 234 10^3/UL (ref 150–450)
POTASSIUM SERPL-SCNC: 3.5 MMOL/L (ref 3.6–5)
PROTHROMBIN TIME: 13.4 SEC (ref 11.4–15.4)
RBC # BLD AUTO: 4.63 10^6/UL (ref 3.72–5.28)
SAO2 % BLDA: 86.9 % (ref 94–98)
WBC # BLD AUTO: 11.7 10^3/UL (ref 4–10.5)

## 2019-09-07 RX ADMIN — METOPROLOL TARTRATE SCH MG: 25 TABLET, FILM COATED ORAL at 09:32

## 2019-09-07 RX ADMIN — Medication SCH ML: at 21:37

## 2019-09-07 RX ADMIN — PIPERACILLIN AND TAZOBACTAM SCH MLS/HR: 3; .375 INJECTION, POWDER, LYOPHILIZED, FOR SOLUTION INTRAVENOUS; PARENTERAL at 09:33

## 2019-09-07 RX ADMIN — AMLODIPINE BESYLATE SCH MG: 5 TABLET ORAL at 09:33

## 2019-09-07 RX ADMIN — GABAPENTIN SCH: 400 CAPSULE ORAL at 05:00

## 2019-09-07 RX ADMIN — ZOLPIDEM TARTRATE SCH MG: 5 TABLET ORAL at 21:36

## 2019-09-07 RX ADMIN — GUAIFENESIN SCH MG: 600 TABLET, EXTENDED RELEASE ORAL at 21:37

## 2019-09-07 RX ADMIN — GABAPENTIN SCH MG: 400 CAPSULE ORAL at 13:51

## 2019-09-07 RX ADMIN — SODIUM CHLORIDE PRN MLS/HR: 9 INJECTION, SOLUTION INTRAVENOUS at 20:29

## 2019-09-07 RX ADMIN — METHYLPREDNISOLONE SODIUM SUCCINATE SCH MG: 40 INJECTION, POWDER, FOR SOLUTION INTRAMUSCULAR; INTRAVENOUS at 05:44

## 2019-09-07 RX ADMIN — PIPERACILLIN AND TAZOBACTAM SCH MLS/HR: 3; .375 INJECTION, POWDER, LYOPHILIZED, FOR SOLUTION INTRAVENOUS; PARENTERAL at 02:07

## 2019-09-07 RX ADMIN — BUDESONIDE SCH MG: 0.5 SUSPENSION RESPIRATORY (INHALATION) at 20:30

## 2019-09-07 RX ADMIN — MORPHINE SULFATE PRN MG: 10 INJECTION INTRAMUSCULAR; INTRAVENOUS; SUBCUTANEOUS at 21:35

## 2019-09-07 RX ADMIN — IPRATROPIUM BROMIDE AND ALBUTEROL SULFATE SCH ML: 2.5; .5 SOLUTION RESPIRATORY (INHALATION) at 14:05

## 2019-09-07 RX ADMIN — HYDROCODONE BITARTRATE AND ACETAMINOPHEN PRN TAB: 5; 325 TABLET ORAL at 09:09

## 2019-09-07 RX ADMIN — GUAIFENESIN SCH MG: 600 TABLET, EXTENDED RELEASE ORAL at 09:33

## 2019-09-07 RX ADMIN — CELECOXIB SCH MG: 200 CAPSULE ORAL at 17:05

## 2019-09-07 RX ADMIN — GABAPENTIN SCH MG: 400 CAPSULE ORAL at 17:05

## 2019-09-07 RX ADMIN — GABAPENTIN SCH: 400 CAPSULE ORAL at 23:21

## 2019-09-07 RX ADMIN — LORAZEPAM PRN MG: 0.5 TABLET ORAL at 17:03

## 2019-09-07 RX ADMIN — METHYLPREDNISOLONE SODIUM SUCCINATE SCH MG: 40 INJECTION, POWDER, FOR SOLUTION INTRAMUSCULAR; INTRAVENOUS at 13:53

## 2019-09-07 RX ADMIN — IPRATROPIUM BROMIDE AND ALBUTEROL SULFATE SCH ML: 2.5; .5 SOLUTION RESPIRATORY (INHALATION) at 20:30

## 2019-09-07 RX ADMIN — FAMOTIDINE SCH MG: 20 TABLET, FILM COATED ORAL at 09:33

## 2019-09-07 RX ADMIN — HYDROCODONE BITARTRATE AND ACETAMINOPHEN PRN TAB: 5; 325 TABLET ORAL at 17:04

## 2019-09-07 RX ADMIN — PIPERACILLIN AND TAZOBACTAM SCH MLS/HR: 3; .375 INJECTION, POWDER, LYOPHILIZED, FOR SOLUTION INTRAVENOUS; PARENTERAL at 16:30

## 2019-09-07 RX ADMIN — DIGOXIN SCH MG: 0.25 TABLET ORAL at 09:33

## 2019-09-07 RX ADMIN — PIPERACILLIN AND TAZOBACTAM SCH MLS/HR: 3; .375 INJECTION, POWDER, LYOPHILIZED, FOR SOLUTION INTRAVENOUS; PARENTERAL at 20:29

## 2019-09-07 RX ADMIN — FUROSEMIDE SCH MG: 10 INJECTION, SOLUTION INTRAMUSCULAR; INTRAVENOUS at 09:34

## 2019-09-07 RX ADMIN — Medication SCH: at 13:52

## 2019-09-07 RX ADMIN — LETROZOLE SCH MG: 2.5 TABLET, FILM COATED ORAL at 09:38

## 2019-09-07 RX ADMIN — LORAZEPAM PRN MG: 0.5 TABLET ORAL at 09:09

## 2019-09-07 RX ADMIN — GABAPENTIN SCH MG: 400 CAPSULE ORAL at 00:15

## 2019-09-07 RX ADMIN — POTASSIUM CHLORIDE SCH MEQ: 750 TABLET, FILM COATED, EXTENDED RELEASE ORAL at 17:07

## 2019-09-07 RX ADMIN — IPRATROPIUM BROMIDE AND ALBUTEROL SULFATE SCH ML: 2.5; .5 SOLUTION RESPIRATORY (INHALATION) at 08:34

## 2019-09-07 RX ADMIN — FAMOTIDINE SCH MG: 20 TABLET, FILM COATED ORAL at 21:37

## 2019-09-07 RX ADMIN — VANCOMYCIN HYDROCHLORIDE SCH MLS/HR: 1 INJECTION, POWDER, LYOPHILIZED, FOR SOLUTION INTRAVENOUS at 13:51

## 2019-09-07 RX ADMIN — METHYLPREDNISOLONE SODIUM SUCCINATE SCH MG: 40 INJECTION, POWDER, FOR SOLUTION INTRAMUSCULAR; INTRAVENOUS at 21:35

## 2019-09-07 RX ADMIN — METOPROLOL TARTRATE SCH MG: 25 TABLET, FILM COATED ORAL at 21:36

## 2019-09-07 RX ADMIN — Medication SCH ML: at 05:44

## 2019-09-07 RX ADMIN — IPRATROPIUM BROMIDE AND ALBUTEROL SULFATE SCH ML: 2.5; .5 SOLUTION RESPIRATORY (INHALATION) at 02:30

## 2019-09-07 RX ADMIN — CELECOXIB SCH MG: 200 CAPSULE ORAL at 09:38

## 2019-09-07 NOTE — RADIOLOGY REPORT (SQ)
EXAM DESCRIPTION:  CTA CHEST



COMPLETED DATE/TIME:  9/7/2019 7:01 pm



REASON FOR STUDY:  R/O PE



COMPARISON:  8/2/2019



TECHNIQUE:  CT scan of the chest performed using helical scanning technique with dynamic intravenous 
contrast injection.  Images reviewed with lung, soft tissue and bone windows.  Reconstructed coronal 
and sagittal MPR images reviewed.

Additional 3 dimensional post-processing performed to develop Maximal Intensity Projection images (MI
P).  All images stored on PACS.

All CT scanners at this facility use dose modulation, iterative reconstruction, and/or weight based d
osing when appropriate to reduce radiation dose to as low as reasonably achievable (ALARA).

[facility]: Dose Right  [facility]: CareDose    [facility]: Dose Right    [facility]: Teradose 4D    [facility]: Smart Technologies



CONTRAST TYPE AND DOSE:  contrast/concentration: Isovue 350.00 mg/ml; Total Contrast Delivered: 49.0 
ml; Total Saline Delivered: 75.0 ml

Contrast bolus adequate for pulmonary arteries and aorta.



RENAL FUNCTION:  GFR > 60.



RADIATION DOSE:  CT Rad equipment meets quality standard of care and radiation dose reduction techniq
ues were employed. CTDIvol: 6.6 - 14.3 mGy. DLP: 510 mGy-cm. .



LIMITATIONS:  None.



FINDINGS:  LUNGS AND PLEURA: There are moderate to large bilateral pleural effusions, significantly i
ncreased compared to prior examination, and associated bibasilar atelectasis or consolidation.  There
 is diffuse bilateral interlobular septal thickening and multiple new and enlarging bilateral pulmona
ry nodules, for example in the right middle lobe measuring 6 mm (series 3, image 57) and in the left 
upper lobe measuring 9 mm (series 3, image 52).

AORTA AND GREAT VESSELS: No aneurysm.  Contrast bolus not optimized for the aorta.

HEART: Trace pericardial effusion unchanged from prior. No significant coronary artery calcifications
.

PULMONARY ARTERIES: No emboli visualized in the main pulmonary arteries or the segmental branches.

HILAR AND MEDIASTINAL STRUCTURES: No identified masses or abnormal nodes.

HARDWARE: None in the chest.

UPPER ABDOMEN: There are innumerable liver lesions in the partially included liver.

THYROID AND OTHER SOFT TISSUES: No masses.  No adenopathy.

BONES: There are numerous redemonstrated sclerotic osseous lesions and a high-grade compression defor
mity of the T12 vertebral body.

3D MIPS: Confirm above findings.

OTHER: No other significant finding.



IMPRESSION:  1.  Negative examination for pulmonary embolism.

2. There are moderate to large bilateral pleural effusions, significantly increased compared to prior
 examination, and associated bibasilar atelectasis or consolidation.  Trace pericardial effusion unch
anged from prior.

3.  Multiple new and enlarging bilateral pulmonary nodules, for example in the right middle lobe paulie
uring 6 mm (series 3, image 57) and in the left upper lobe measuring 9 mm (series 3, image 52), consi
stent with metastatic disease.

4.  There is diffuse bilateral interlobular septal thickening, which may reflect pulmonary edema or a
lternately lymphangitic spread of malignancy.

5.  Osseous metastatic disease including compression deformity of T12.

6.  Hepatic metastatic disease.



COMMENT:  Quality ID # [id number]: Final reports with documentation of one or more dose reduction techniques
 (e.g., Automated exposure control, adjustment of the mA and/or kV according to patient size, use of 
iterative reconstruction technique)



TECHNICAL DOCUMENTATION:  JOB ID:  [id number]

 2011 [employer]- All Rights Reserved



Reading location - IP/workstation name: [email or web address]

## 2019-09-07 NOTE — PDOC PROGRESS REPORT
Subjective


Progress Note for:: 09/07/19


Subjective:: 





The patient is on Oxymizer nasal cannula while brushing her teeth.  Saturations 

dropped into the 70s.  This is only off of BiPAP for 6 to 7 minutes.


Reason For Visit: 


PNEUMONIA








Physical Exam


Vital Signs: 


                                        











Temp Pulse Resp BP Pulse Ox


 


 97.9 F   75   28 H  145/76 H  93 


 


 09/07/19 08:00  09/07/19 08:30  09/07/19 10:00  09/07/19 08:00  09/07/19 10:00








                                 Intake & Output











 09/06/19 09/07/19 09/08/19





 06:59 06:59 06:59


 


Intake Total 1075 2050 


 


Output Total 2715 2225 120


 


Balance -1640 -175 -120


 


Weight 62.1 kg 57.2 kg 











General appearance: PRESENT: cooperative, mild distress, well-developed - Well-

developed but frail 5-year-old female resting in bed.  She is somewhat 

tachypneic.


Head exam: PRESENT: atraumatic, normocephalic


Eye exam: PRESENT: conjunctiva pink.  ABSENT: scleral icterus


Ear exam: PRESENT: normal external ear exam.  ABSENT: bleeding, drainage


Mouth exam: PRESENT: dry mucosa, tongue midline


Respiratory exam: PRESENT: crackles - Bilaterally mid lung fields, symmetrical, 

tachypnea, other.  ABSENT: rhonchi, stridor, wheezes - Musical tone to 

exhalation has dissipated.


Cardiovascular exam: PRESENT: RRR, +S1, +S2


GI/Abdominal exam: PRESENT: diminished bowel sounds, soft.  ABSENT: guarding, 

tenderness


Rectal exam: PRESENT: deferred


Gentrourinary exam: PRESENT: indwelling catheter, other - Pale yellow urine


Extremities exam: ABSENT: calf tenderness, pedal edema


Musculoskeletal exam: PRESENT: other - Decreased muscle mass


Neurological exam: PRESENT: alert - Alert but not fully alert, awake, oriented 

to person, oriented to place, oriented to situation


Psychiatric exam: PRESENT: flat affect.  ABSENT: agitated, anxious


Focused psych exam: ABSENT: delusional, restlessness





Results


Laboratory Results: 


                                        





                                 09/07/19 04:43 





                                 09/07/19 04:43 





                                        











  09/06/19 09/06/19 09/07/19





  12:16 17:20 04:43


 


WBC   


 


RBC   


 


Hgb   


 


Hct   


 


MCV   


 


MCH   


 


MCHC   


 


RDW   


 


Plt Count   


 


Sodium   140.6  143.8


 


Potassium   3.3 L  3.5 L


 


Chloride   97 L  102


 


Carbon Dioxide   37 H  35 H


 


Anion Gap   7  7


 


BUN   45 H  46 H


 


Creatinine  1.03  0.89  0.92


 


Est GFR ( Amer)  > 60  > 60  > 60


 


Glucose   164 H  123 H


 


Calcium   8.4  8.4


 


Magnesium    2.3














  09/07/19





  04:43


 


WBC  11.7 H


 


RBC  4.63


 


Hgb  13.5


 


Hct  41.9


 


MCV  90


 


MCH  29.2


 


MCHC  32.3


 


RDW  14.9 H


 


Plt Count  234


 


Sodium 


 


Potassium 


 


Chloride 


 


Carbon Dioxide 


 


Anion Gap 


 


BUN 


 


Creatinine 


 


Est GFR (African Amer) 


 


Glucose 


 


Calcium 


 


Magnesium 








                                        





09/01/19 14:30   Blood   Blood Culture - Final


                            NO GROWTH IN 5 DAYS


09/01/19 10:09   Blood   Blood Culture - Final


                            NO GROWTH IN 5 DAYS





                                        











  09/04/19





  18:45


 


Troponin I  0.730











Impressions: 


                                        





Chest X-Ray  09/06/19 10:34


IMPRESSION:  Grossly stable appearance of the chest.  Persistent diffuse 

bilateral airspace disease most marked in the lung bases.  Small effusions.


 














Assessment and Plan





- Diagnosis


(1) Pneumonia


Qualifiers: 


   Pneumonia type: due to unspecified organism   Laterality: left   Lung 

location: lower lobe of lung   Qualified Code(s): J18.1 - Lobar pneumonia, 

unspecified organism   


Is this a current diagnosis for this admission?: Yes   


Plan: 


9/5/2019-the patient presented with a left lower lobe pneumonia.  She was 

initially on levofloxacin.  Due to increasing white blood cell count and 

deteriorating clinical condition she was changed to Zosyn and vancomycin.  She 

is currently on BiPAP.  Earlier today she required 100% FiO2 to keep her oxygen 

saturation greater than 90%.  She has nebulizer treatments available.  We will 

continue to monitor white blood cell count and chest x-ray.


9/6/2019-we will continue to try and wean the BiPAP.  She is down to 65%.  We 

will continue to administer antibiotics.  We will continue to follow.


9/7/2019-the patient was on nasal cannula for several minutes just to pressure 

teeth and her oxygen saturation dropped into the low 70s.  We are going to try 

high flow nasal cannula so she does have time off of BiPAP so that she can try 

to eat and drink.  X-ray still shows bibasilar airspace disease.  She has 

crackles bilaterally in the upper fields.  It is possible that she has some 

pulmonary fibrosis.  We will continue her antibiotics as well as scheduled 

nebulizer treatments and systemic steroids.








(2) Acute respiratory failure with hypoxemia


Is this a current diagnosis for this admission?: Yes   


Plan: 


9/5/2019-respiratory failure secondary to pulmonary edema and underlying 

pneumonia.  X-ray also suggests pulmonary fibrosis.  We will try and taper the 

BiPAP back to high flow nasal cannula.  Combination of antibiotics and diuretics

should help.  In addition now that she is back in sinus rhythm we will likely be

able to reduce the pulmonary edema.  In addition small dose of morphine sulfate 

are available to help decrease air hunger and relax the breathing.


9/6/2019-she still requires significant support by way of BiPAP.  We are going 

to try small windows of high flow nasal cannula.


9/7/2019-nasal cannula just to pressure teeth her oxygen saturation dropped 

below 75%.  Going to try high flow nasal cannula to see if we can keep her off 

of the BiPAP mask for windows that she can try and eat and drink.  Still strugg

ling to make significant progress weaning the BiPAP.  She has had significant 

negative fluid balance and so I do not believe any of this is cardiogenic.  We 

will recheck an EKG and troponin level regardless.  We will check an arterial 

blood gas now.  Pulmonary embolus is another consideration especially with her 

underlying metastatic malignancy.








(3) Metastatic breast cancer


Is this a current diagnosis for this admission?: Yes   


Plan: 


9/5/2019-the patient is recently diagnosed with metastatic breast cancer.  She 

currently has metastatic disease to the liver and the spine.  She recently had a

Port-A-Cath put in place.  She has had to begin chemotherapy.


9/6/2019-still causing pain.  Will control pain.  Decisions about chemotherapy 

depending on her recovery from this episode.


9/7/2019-reviewed the PET scan and she has diffuse metastatic lesions in the 

skull, shoulders, ribs and spine as well as lower extremities.  There is a new 

Port-A-Cath with an intention of treatment.








(4) Flash pulmonary edema


Is this a current diagnosis for this admission?: Yes   


Plan: 


9/5/2019-pulmonary edema likely secondary to the atrial fibrillation and 

possibly pneumonia.  With diuretics the patient has an approximately net 

negative fluid balance of 5 L.  As her BUN is beginning to climb we have reduce 

the intravenous fluid and I am going to decrease the furosemide to 40 mg IV 

twice daily from every 8 hours.  Continue to monitor renal function, 

electrolytes and intake and output.


9/6/2019-we continued have a negative fluid balance.  This is even with 

decreasing her furosemide slightly.  Continue current regimen.


9/7/2019-negative fluid balance of over 6 L in the past 5 days.  My x-ray the 

pulmonary edema is resolved.  Still with crackles possibly related to scarring.








(5) Anxiety


Is this a current diagnosis for this admission?: Yes   


Plan: 


9/5/2019-in addition to antianxiety medications, small doses of morphine will 

help relax the breathing.


9/6/2019-continue current regimen.  He does seem relaxed.


9/7/2019-still with difficulty breathing.  She tends to get tachypnea.  Continue

current medications.  She may need increased doses but this potentially could 

start her breathing.








(6) Atrial fibrillation with RVR


Is this a current diagnosis for this admission?: Yes   


Plan: 


9/5/2019-the patient went into rapid atrial fibrillation.  She was placed on 

diltiazem infusion.  This failed to control or convert the patient.  Subseq

uently she was given several doses of intravenous digoxin.  This in fact was 

successful in converting the patient to sinus rhythm.  We will continue the 

digoxin and add low-dose metoprolol.  Consider echocardiogram when patient is 

stable.  The patient is having hematuria and so no anticoagulation at this time.


9/6/2019-back in sinus rhythm.  Continue to monitor.


9/7/2019-she is in sinus rhythm with excellent rate control.








(7) Hematuria


Qualifiers: 


   Hematuria type: unspecified type   Qualified Code(s): R31.9 - Hematuria, 

unspecified   


Is this a current diagnosis for this admission?: Yes   


Plan: 


9/5/2019-we will discontinue anticoagulation.  We will continue to monitor pat

ient's urine.  When hematuria clears consider anticoagulation although this may 

not be necessary if the patient remains in sinus rhythm.  With an underlying 

malignancy she might be prone to hypercoagulability.  We will continue to 

monitor.


9/6/2019-holding anticoagulation.  Continue to monitor urine.


9/7/2019-hematuria is resolved.  Will resume DVT prophylaxis.

## 2019-09-08 LAB
ABSOLUTE LYMPHOCYTES# (MANUAL): 0.6 10^3/UL (ref 0.5–4.7)
ABSOLUTE MONOCYTES # (MANUAL): 0.3 10^3/UL (ref 0.1–1.4)
ADD MANUAL DIFF: YES
ANION GAP SERPL CALC-SCNC: 5 MMOL/L (ref 5–19)
ANISOCYTOSIS BLD QL SMEAR: SLIGHT
BASOPHILS NFR BLD MANUAL: 0 % (ref 0–2)
BUN SERPL-MCNC: 36 MG/DL (ref 7–20)
CALCIUM: 8.4 MG/DL (ref 8.4–10.2)
CHLORIDE SERPL-SCNC: 108 MMOL/L (ref 98–107)
CO2 SERPL-SCNC: 33 MMOL/L (ref 22–30)
DIGOXIN SERPL-MCNC: 2.19 NG/ML (ref 0.8–2)
EOSINOPHIL NFR BLD MANUAL: 0 % (ref 0–6)
ERYTHROCYTE [DISTWIDTH] IN BLOOD BY AUTOMATED COUNT: 15 % (ref 11.5–14)
GLUCOSE SERPL-MCNC: 122 MG/DL (ref 75–110)
HCT VFR BLD CALC: 42.6 % (ref 36–47)
HGB BLD-MCNC: 13.7 G/DL (ref 12–15.5)
MCH RBC QN AUTO: 29.5 PG (ref 27–33.4)
MCHC RBC AUTO-ENTMCNC: 32.3 G/DL (ref 32–36)
MCV RBC AUTO: 91 FL (ref 80–97)
MONOCYTES % (MANUAL): 2 % (ref 3–13)
PLATELET # BLD: 232 10^3/UL (ref 150–450)
PLATELET COMMENT: ADEQUATE
POTASSIUM SERPL-SCNC: 4.5 MMOL/L (ref 3.6–5)
RBC # BLD AUTO: 4.66 10^6/UL (ref 3.72–5.28)
SEGMENTED NEUTROPHILS % (MAN): 93 % (ref 42–78)
TOTAL CELLS COUNTED BLD: 100
VARIANT LYMPHS NFR BLD MANUAL: 5 % (ref 13–45)
WBC # BLD AUTO: 12.5 10^3/UL (ref 4–10.5)

## 2019-09-08 RX ADMIN — GUAIFENESIN SCH MG: 600 TABLET, EXTENDED RELEASE ORAL at 21:02

## 2019-09-08 RX ADMIN — LORAZEPAM PRN MG: 2 INJECTION INTRAMUSCULAR; INTRAVENOUS at 21:01

## 2019-09-08 RX ADMIN — CELECOXIB SCH MG: 200 CAPSULE ORAL at 17:21

## 2019-09-08 RX ADMIN — METHYLPREDNISOLONE SODIUM SUCCINATE SCH MG: 40 INJECTION, POWDER, FOR SOLUTION INTRAMUSCULAR; INTRAVENOUS at 21:01

## 2019-09-08 RX ADMIN — GABAPENTIN SCH MG: 400 CAPSULE ORAL at 17:21

## 2019-09-08 RX ADMIN — POTASSIUM CHLORIDE SCH MEQ: 750 TABLET, FILM COATED, EXTENDED RELEASE ORAL at 17:29

## 2019-09-08 RX ADMIN — GUAIFENESIN SCH MG: 600 TABLET, EXTENDED RELEASE ORAL at 13:17

## 2019-09-08 RX ADMIN — SODIUM CHLORIDE PRN MLS/HR: 9 INJECTION, SOLUTION INTRAVENOUS at 17:35

## 2019-09-08 RX ADMIN — POTASSIUM CHLORIDE SCH MEQ: 750 TABLET, FILM COATED, EXTENDED RELEASE ORAL at 13:18

## 2019-09-08 RX ADMIN — FAMOTIDINE SCH MG: 20 TABLET, FILM COATED ORAL at 21:02

## 2019-09-08 RX ADMIN — FAMOTIDINE SCH MG: 20 TABLET, FILM COATED ORAL at 13:17

## 2019-09-08 RX ADMIN — IPRATROPIUM BROMIDE AND ALBUTEROL SULFATE SCH ML: 2.5; .5 SOLUTION RESPIRATORY (INHALATION) at 08:08

## 2019-09-08 RX ADMIN — PIPERACILLIN AND TAZOBACTAM SCH MLS/HR: 3; .375 INJECTION, POWDER, LYOPHILIZED, FOR SOLUTION INTRAVENOUS; PARENTERAL at 17:22

## 2019-09-08 RX ADMIN — ZOLPIDEM TARTRATE SCH MG: 5 TABLET ORAL at 21:02

## 2019-09-08 RX ADMIN — MORPHINE SULFATE PRN MG: 10 INJECTION INTRAMUSCULAR; INTRAVENOUS; SUBCUTANEOUS at 18:22

## 2019-09-08 RX ADMIN — LORAZEPAM PRN MG: 2 INJECTION INTRAMUSCULAR; INTRAVENOUS at 13:56

## 2019-09-08 RX ADMIN — PIPERACILLIN AND TAZOBACTAM SCH MLS/HR: 3; .375 INJECTION, POWDER, LYOPHILIZED, FOR SOLUTION INTRAVENOUS; PARENTERAL at 21:01

## 2019-09-08 RX ADMIN — IPRATROPIUM BROMIDE AND ALBUTEROL SULFATE SCH ML: 2.5; .5 SOLUTION RESPIRATORY (INHALATION) at 20:21

## 2019-09-08 RX ADMIN — BUDESONIDE SCH MG: 0.5 SUSPENSION RESPIRATORY (INHALATION) at 08:08

## 2019-09-08 RX ADMIN — METOPROLOL TARTRATE SCH MG: 25 TABLET, FILM COATED ORAL at 21:02

## 2019-09-08 RX ADMIN — METHYLPREDNISOLONE SODIUM SUCCINATE SCH MG: 40 INJECTION, POWDER, FOR SOLUTION INTRAMUSCULAR; INTRAVENOUS at 17:22

## 2019-09-08 RX ADMIN — IPRATROPIUM BROMIDE AND ALBUTEROL SULFATE SCH ML: 2.5; .5 SOLUTION RESPIRATORY (INHALATION) at 02:12

## 2019-09-08 RX ADMIN — METHYLPREDNISOLONE SODIUM SUCCINATE SCH MG: 40 INJECTION, POWDER, FOR SOLUTION INTRAMUSCULAR; INTRAVENOUS at 05:26

## 2019-09-08 RX ADMIN — GABAPENTIN SCH: 400 CAPSULE ORAL at 23:06

## 2019-09-08 RX ADMIN — IPRATROPIUM BROMIDE AND ALBUTEROL SULFATE SCH ML: 2.5; .5 SOLUTION RESPIRATORY (INHALATION) at 14:05

## 2019-09-08 RX ADMIN — VANCOMYCIN HYDROCHLORIDE SCH MLS/HR: 1 INJECTION, POWDER, LYOPHILIZED, FOR SOLUTION INTRAVENOUS at 13:16

## 2019-09-08 RX ADMIN — MORPHINE SULFATE PRN MG: 10 INJECTION INTRAMUSCULAR; INTRAVENOUS; SUBCUTANEOUS at 13:56

## 2019-09-08 RX ADMIN — PIPERACILLIN AND TAZOBACTAM SCH MLS/HR: 3; .375 INJECTION, POWDER, LYOPHILIZED, FOR SOLUTION INTRAVENOUS; PARENTERAL at 13:16

## 2019-09-08 RX ADMIN — BUDESONIDE SCH MG: 0.5 SUSPENSION RESPIRATORY (INHALATION) at 20:21

## 2019-09-08 RX ADMIN — AMLODIPINE BESYLATE SCH MG: 5 TABLET ORAL at 13:17

## 2019-09-08 RX ADMIN — METOPROLOL TARTRATE SCH MG: 25 TABLET, FILM COATED ORAL at 13:21

## 2019-09-08 RX ADMIN — GABAPENTIN SCH MG: 400 CAPSULE ORAL at 13:18

## 2019-09-08 RX ADMIN — GABAPENTIN SCH: 400 CAPSULE ORAL at 05:26

## 2019-09-08 RX ADMIN — Medication SCH: at 13:59

## 2019-09-08 RX ADMIN — Medication SCH ML: at 21:02

## 2019-09-08 RX ADMIN — Medication SCH ML: at 05:27

## 2019-09-08 RX ADMIN — LETROZOLE SCH MG: 2.5 TABLET, FILM COATED ORAL at 13:21

## 2019-09-08 RX ADMIN — PIPERACILLIN AND TAZOBACTAM SCH MLS/HR: 3; .375 INJECTION, POWDER, LYOPHILIZED, FOR SOLUTION INTRAVENOUS; PARENTERAL at 02:42

## 2019-09-08 RX ADMIN — MORPHINE SULFATE PRN MG: 10 INJECTION INTRAMUSCULAR; INTRAVENOUS; SUBCUTANEOUS at 09:05

## 2019-09-08 RX ADMIN — CELECOXIB SCH MG: 200 CAPSULE ORAL at 13:22

## 2019-09-08 RX ADMIN — FUROSEMIDE SCH MG: 10 INJECTION, SOLUTION INTRAMUSCULAR; INTRAVENOUS at 13:23

## 2019-09-08 NOTE — EKG REPORT
SEVERITY:- BORDERLINE ECG -

SINUS RHYTHM

PROBABLE LEFT ATRIAL ABNORMALITY

:

Confirmed by: Ivy Howell 08-Sep-2019 18:32:58

## 2019-09-08 NOTE — ADVANCED CARE
- Diagnosis


(1) Pleural effusion


Diagnosis Current: Yes   





(2) Metastatic breast cancer


Diagnosis Current: Yes   





(3) Acute respiratory failure with hypoxemia


Diagnosis Current: Yes   


Attendance: 


Discussed with patient and sister





Resuscitation Status: Do Not Resuscitate


Discussion: 





Patient agrees with DNR, no intubation no compressions

## 2019-09-08 NOTE — PDOC CONSULTATION
Consultation


Consult Date: 19


Attending physician:: IRA PALMER


Provider Consulted: ADILIA VILLATORO


Consult reason:: Stage IV breast cancer, here with respiratory failure





History of Present Illness


Admission Date/PCP: 


  19 14:26





  EVELINA SANCHEZ PA-C





Patient complains of: Worsening breathing


History of Present Illness: 


ROBERTA MASON is a 59 year old female with recently diagnosed stage IV breast 

cancer, she has a previous history of leukemia in remission from about 3 years 

ago.  She had left-sided stage III breast cancer status post mastectomy then 

chemotherapy adjuvantly and then radiation, this was diagnosed in 2017.  She the

n was placed on oral endocrine therapy with Femara.  About 6 weeks ago she 

presented to her primary oncologist, Dr. Pope, and she lost about 10 pounds was

having some increased pain, she does had CT of the chest abdomen pelvis and bone

scan for restaging, unfortunately this indicated widely metastatic disease with 

multiple bone mets throughout the spine and pelvis, ribs, as well as liver 

metastasis and a small left pleural effusion.  She then had liver biopsy which 

indicated metastatic carcinoma consistent with breast primary.  She had breast 

markers done and this indicated ER/NE positive, HER-2 negative.  She recently 

had a port placed and was being planned for systemic chemotherapy through her 

previous oncologist.  Unfortunately a few days after port placement she began 

having some increased shortness of breath, dyspnea on exertion, cough and 

presented here.  Initially she was being treated as a pneumonia but over the 

last 48 hours her breathing worsened and ultimately patient required BiPAP and 

was then transferred to the ICU.  She is currently on intermittent BiPAP, 

alternating with high flow O2 via nasal cannula.  Because of the lung worsening 

she had repeat CTA of the chest, unfortunately this indicates that the effusions

have increased dramatically, she has bilateral effusions left greater than 

right.








Past Medical History


Cardiac Medical History: 


   Denies: Coronary Artery Disease, Myocardial Infarction, Hypertension


Pulmonary Medical History: Reports: Bronchitis


   Denies: Asthma, Chronic Obstructive Pulmonary Disease (COPD), Pneumonia


Neurological Medical History: 


   Denies: Ischemic CVA, Seizures


Endocrine Medical History: Reports: None


Malignancy Medical History: Reports: Breast Cancer - Stage IV breast cancer with

liver and bone mets, Leukemia


GI Medical History: Reports: None


Musculoskeltal Medical History: 


   Denies: Arthritis


Psychiatric Medical History: Reports: Depression


Traumatic Medical History: Reports: None


Hematology: 


   Denies: Anemia


Infectious Medical History: Reports: None





Past Surgical History


Past Surgical History: Reports: Mastectomy - Left, Orthopedic Surgery - neck, R 

Wrist, Left ankle, Other - Port placement


   Denies: Hysterectomy





Social History


Information Source: Patient, Relative, [facility] Records


Lives with: Family


Smoking Status: Former Smoker


Cigarettes Packs Per Day: 1


Number of Years Smokin


Frequency of Alcohol Use: None


Hx Recreational Drug Use: No


Drugs: None


Hx Prescription Drug Abuse: No





- Advance Directive


Resuscitation Status: Do Not Resuscitate





Family History


Family History: Reviewed & Not Pertinent


Parental Family History Reviewed: Yes


Children Family History Reviewed: Yes


Sibling(s) Family History Reviewed.: Yes





Medication/Allergy


Home Medications: 








Celecoxib [Celebrex 200 mg Capsule] 200 mg PO BID 19 


Diclofenac Sodium [Voltaren] 4 gm TOP QID 19 


Gabapentin [Neurontin] 800 mg PO Q6 19 


Hydrocodone/Acetaminophen [Norco 5-325 mg Tablet] 1 tab PO Q6HP PRN 19 


Letrozole [Femara 2.5 mg Tablet] 2.5 mg PO DAILY 19 


Zolpidem Tartrate [Ambien] 10 mg PO QHS 19 








Allergies/Adverse Reactions: 


                                        





No Known Allergies Allergy (Verified 19 07:00)


   











Review of Systems


Constitutional: PRESENT: fatigue, weakness, weight loss


Cardiovascular: PRESENT: dyspnea on exertion


Respiratory: PRESENT: dyspnea


Gastrointestinal: ABSENT: abdominal pain, constipation, diarrhea, hematemesis, 

hematochezia, nausea, vomiting


Genitourinary: ABSENT: dysuria, hematuria


Integumentary: ABSENT: rash, wounds


Neurological: ABSENT: abnormal gait, abnormal speech, confusion, dizziness, 

focal weakness, syncope


Psychiatric: PRESENT: anxiety





Physical Exam


Vital Signs: 


                                        











Temp Pulse Resp BP Pulse Ox


 


 97.7 F   74   28 H  147/75 H  98 


 


 19 03:22  19 08:09  19 11:33  19 04:36  19 11:33








                                 Intake & Output











 19





 06:59 06:59 06:59


 


Intake Total 2237 1657 


 


Output Total 1638 1545 


 


Balance -175 342 


 


Weight 57.2 kg 59.2 kg 











General appearance: PRESENT: no acute distress, well-developed, well-nourished


Head exam: PRESENT: atraumatic, normocephalic


Eye exam: PRESENT: conjunctiva pink, EOMI, PERRLA.  ABSENT: scleral icterus


Ear exam: PRESENT: normal external ear exam


Mouth exam: PRESENT: moist, tongue midline


Neck exam: ABSENT: carotid bruit, JVD, lymphadenopathy, thyromegaly


Respiratory exam: PRESENT: clear to auscultation ridge.  ABSENT: rales, rhonchi, 

wheezes


Cardiovascular exam: PRESENT: RRR.  ABSENT: diastolic murmur, rubs, systolic 

murmur


Pulses: PRESENT: normal dorsalis pedis pul


Vascular exam: PRESENT: normal capillary refill


GI/Abdominal exam: PRESENT: normal bowel sounds, soft.  ABSENT: distended, 

guarding, mass, organolmegaly, rebound, tenderness


Rectal exam: PRESENT: deferred


Extremities exam: PRESENT: full ROM.  ABSENT: calf tenderness, clubbing, pedal 

edema


Neurological exam: PRESENT: alert, awake, oriented to person, oriented to place,

 oriented to time, oriented to situation, CN II-XII grossly intact.  ABSENT: 

motor sensory deficit


Psychiatric exam: PRESENT: appropriate affect, normal mood.  ABSENT: homicidal 

ideation, suicidal ideation


Skin exam: PRESENT: dry, intact, warm.  ABSENT: cyanosis, rash





Results


Laboratory Results: 


                                        





                                 19 05:31 





                                 19 05:31 





                                        











  19





  05:31 05:31


 


WBC   12.5 H


 


RBC   4.66


 


Hgb   13.7


 


Hct   42.6


 


MCV   91


 


MCH   29.5


 


MCHC   32.3


 


RDW   15.0 H


 


Plt Count   232


 


Seg Neutrophils %   Not Reportable


 


Sodium  146.0 H 


 


Potassium  4.5 


 


Chloride  108 H 


 


Carbon Dioxide  33 H 


 


Anion Gap  5 


 


BUN  36 H 


 


Creatinine  0.80 


 


Est GFR (African Amer)  > 60 


 


Glucose  122 H 


 


Calcium  8.4 


 


Magnesium  2.3 








                                        











  19





  18:45 16:00


 


Troponin I  0.730  0.073











Impressions: 


                                        





Chest X-Ray  19 10:34


IMPRESSION:  Grossly stable appearance of the chest.  Persistent diffuse 

bilateral airspace disease most marked in the lung bases.  Small effusions.


 








Chest/Abdomen CTA  19 00:00


IMPRESSION:  1.  Negative examination for pulmonary embolism.


2. There are moderate to large bilateral pleural effusions, significantly inc

reased compared to prior examination, and associated bibasilar atelectasis or 

consolidation.  Trace pericardial effusion unchanged from prior.


3.  Multiple new and enlarging bilateral pulmonary nodules, for example in the 

right middle lobe measuring 6 mm (series 3, image 57) and in the left upper lobe

 measuring 9 mm (series 3, image 52), consistent with metastatic disease.


4.  There is diffuse bilateral interlobular septal thickening, which may reflect

 pulmonary edema or alternately lymphangitic spread of malignancy.


5.  Osseous metastatic disease including compression deformity of T12.


6.  Hepatic metastatic disease.


 











Status: Image reviewed by me





Assessment & Plan





- Diagnosis


(1) Pleural effusion


Is this a current diagnosis for this admission?: Yes   


Plan: 


Bilateral pleural effusion, may be malignant, I believe this is reasonable 

because of her worsening dyspnea and respiratory requirements, recommended 

consideration of bilateral thoracentesis.  I went over the risks and benefits of

 this with the patient and sister who is bedside, they seem to agree with the 

procedure.  We did discuss the possibility that a pneumothorax may cause the 

need for chest tube.  And although that we are trying to help her respiratory 

status it could make her respiratory status worse, if a major pneumothorax were 

to happen.  She understands the risks and benefits and would like to proceed 

with it.  I think this would be the only chance to help her breathing.





Of note, I reviewed the images and there was progression of disease in terms of 

the liver metastasis from the previous scan 6 weeks ago so would be reasonable 

that these pleural effusions or malignancy related.








(2) Metastatic breast cancer


Is this a current diagnosis for this admission?: Yes   


Plan: 


ER positive breast cancer, she was being planned for chemotherapy, there are a 

lot of targeted therapies that are available as well.  Hopefully she may get a 

chance to receive some of these treatments.








(3) Acute respiratory failure with hypoxemia


Is this a current diagnosis for this admission?: Yes   


Plan: 


Multifactorial but some part due to the pleural effusions as above, plan for 

thoracentesis








- Time


Time Spent: Greater than 70 Minutes





- Inpatient Certification


Based on my medical assessment, after consideration of the patient's 

comorbidities, presenting symptoms, or acuity I expect that the services needed 

warrant INPATIENT care.: Yes


I certify that my determination is in accordance with my understanding of 

Medicare's requirements for reasonable and necessary INPATIENT services [42 CFR 

412.3e].: Yes


Medical Necessity: Need For Continuous Telemetry Monitoring, Need for Nebulizer 

Therapy and Monitoring of Response, Need for Surgery, Risk of Complication if 

Not Cared For in Hospital

## 2019-09-08 NOTE — PDOC PROGRESS REPORT
Subjective


Progress Note for:: 09/08/19


Subjective:: 


The patient is resting in bed on BiPAP.  She is tachypnea.  We took the BiPAP 

mask off so that we can have a discussion.


Reason For Visit: 


PNEUMONIA








Physical Exam


Vital Signs: 


                                        











Temp Pulse Resp BP Pulse Ox


 


 97.7 F   74   28 H  147/75 H  98 


 


 09/08/19 03:22  09/08/19 08:09  09/08/19 11:33  09/08/19 04:36  09/08/19 11:33








                                 Intake & Output











 09/07/19 09/08/19 09/09/19





 06:59 06:59 06:59


 


Intake Total 2050 1887 


 


Output Total 2225 1545 


 


Balance -175 342 


 


Weight 57.2 kg 59.2 kg 











General appearance: PRESENT: cooperative, mild distress - Mild to moderate 

distress, thin


Head exam: PRESENT: atraumatic, normocephalic


Eye exam: PRESENT: conjunctiva pale.  ABSENT: scleral icterus


Ear exam: PRESENT: normal external ear exam.  ABSENT: bleeding, drainage


Mouth exam: PRESENT: dry mucosa, tongue midline


Respiratory exam: PRESENT: accessory muscle use, decreased breath sounds, rales,

symmetrical, tachypnea.  ABSENT: rhonchi, wheezes


Cardiovascular exam: PRESENT: RRR, +S1, +S2


GI/Abdominal exam: PRESENT: diminished bowel sounds, soft.  ABSENT: guarding, 

tenderness


Extremities exam: ABSENT: joint swelling, pedal edema


Musculoskeletal exam: ABSENT: normal inspection - Decreased muscle mass


Neurological exam: PRESENT: alert, awake, oriented to person, oriented to place,

oriented to time, oriented to situation, CN II-XII grossly intact


Psychiatric exam: PRESENT: anxious, appropriate affect


Focused psych exam: ABSENT: delusional, restlessness





Results


Laboratory Results: 


                                        





                                 09/08/19 05:31 





                                 09/08/19 05:31 





                                        











  09/07/19 09/08/19 09/08/19





  13:10 05:31 05:31


 


WBC    12.5 H


 


RBC    4.66


 


Hgb    13.7


 


Hct    42.6


 


MCV    91


 


MCH    29.5


 


MCHC    32.3


 


RDW    15.0 H


 


Plt Count    232


 


Seg Neutrophils %    Not Reportable


 


Carbonic Acid  1.36 H  


 


HCO3/H2CO3 Ratio  22:1  


 


ABG pH  7.45  


 


ABG pCO2  45.2 H  


 


ABG pO2  50.1 L  


 


ABG HCO3  30.7 H  


 


ABG O2 Saturation  86.9 L  


 


ABG Base Excess  5.8  


 


FiO2  67%  


 


Sodium   146.0 H 


 


Potassium   4.5 


 


Chloride   108 H 


 


Carbon Dioxide   33 H 


 


Anion Gap   5 


 


BUN   36 H 


 


Creatinine   0.80 


 


Est GFR ( Amer)   > 60 


 


Glucose   122 H 


 


Calcium   8.4 


 


Magnesium   2.3 








                                        











  09/04/19 09/07/19





  18:45 16:00


 


Troponin I  0.730  0.073











Impressions: 


                                        





Chest X-Ray  09/06/19 10:34


IMPRESSION:  Grossly stable appearance of the chest.  Persistent diffuse bilate

ral airspace disease most marked in the lung bases.  Small effusions.


 








Chest/Abdomen CTA  09/07/19 00:00


IMPRESSION:  1.  Negative examination for pulmonary embolism.


2. There are moderate to large bilateral pleural effusions, significantly 

increased compared to prior examination, and associated bibasilar atelectasis or

consolidation.  Trace pericardial effusion unchanged from prior.


3.  Multiple new and enlarging bilateral pulmonary nodules, for example in the 

right middle lobe measuring 6 mm (series 3, image 57) and in the left upper lobe

measuring 9 mm (series 3, image 52), consistent with metastatic disease.


4.  There is diffuse bilateral interlobular septal thickening, which may reflect

pulmonary edema or alternately lymphangitic spread of malignancy.


5.  Osseous metastatic disease including compression deformity of T12.


6.  Hepatic metastatic disease.


 














Assessment and Plan





- Diagnosis


(1) Pneumonia


Qualifiers: 


   Pneumonia type: due to unspecified organism   Laterality: left   Lung 

location: lower lobe of lung   Qualified Code(s): J18.1 - Lobar pneumonia, 

unspecified organism   


Is this a current diagnosis for this admission?: Yes   


Plan: 


9/5/2019-the patient presented with a left lower lobe pneumonia.  She was 

initially on levofloxacin.  Due to increasing white blood cell count and 

deteriorating clinical condition she was changed to Zosyn and vancomycin.  She 

is currently on BiPAP.  Earlier today she required 100% FiO2 to keep her oxygen 

saturation greater than 90%.  She has nebulizer treatments available.  We will 

continue to monitor white blood cell count and chest x-ray.


9/6/2019-we will continue to try and wean the BiPAP.  She is down to 65%.  We 

will continue to administer antibiotics.  We will continue to follow.


9/7/2019-the patient was on nasal cannula for several minutes just to pressure 

teeth and her oxygen saturation dropped into the low 70s.  We are going to try 

high flow nasal cannula so she does have time off of BiPAP so that she can try 

to eat and drink.  X-ray still shows bibasilar airspace disease.  She has 

crackles bilaterally in the upper fields.  It is possible that she has some 

pulmonary fibrosis.  We will continue her antibiotics as well as scheduled 

nebulizer treatments and systemic steroids.


9/8/2019-the patient remains dominantly BiPAP dependent.  She is tolerating 

windows of high flow nasal cannula.  She is still on dual antibiotic therapy.  

We did get a CT scan to determine if a pulmonary embolus was contributing to her

difficulty breathing however we did find large bilateral pleural effusions with 

metastatic disease.  We will continue the antibiotics at this time as they are 

close to completion.  I believe that the effusions are more related to 

malignancy.








(2) Acute respiratory failure with hypoxemia


Is this a current diagnosis for this admission?: Yes   


Plan: 


9/5/2019-respiratory failure secondary to pulmonary edema and underlying 

pneumonia.  X-ray also suggests pulmonary fibrosis.  We will try and taper the 

BiPAP back to high flow nasal cannula.  Combination of antibiotics and diuretics

should help.  In addition now that she is back in sinus rhythm we will likely be

able to reduce the pulmonary edema.  In addition small dose of morphine sulfate 

are available to help decrease air hunger and relax the breathing.


9/6/2019-she still requires significant support by way of BiPAP.  We are going 

to try small windows of high flow nasal cannula.


9/7/2019-nasal cannula just to pressure teeth her oxygen saturation dropped 

below 75%.  Going to try high flow nasal cannula to see if we can keep her off 

of the BiPAP mask for windows that she can try and eat and drink.  Still 

struggling to make significant progress weaning the BiPAP.  She has had 

significant negative fluid balance and so I do not believe any of this is 

cardiogenic.  We will recheck an EKG and troponin level regardless.  We will 

check an arterial blood gas now.  Pulmonary embolus is another consideration 

especially with her underlying metastatic malignancy.


9/8/2019-with additional information the respiratory failure is now more due to 

the pleural effusions which certainly could be parapneumonic but are most likely

metastatic disease.  See below.








(3) Metastatic breast cancer


Is this a current diagnosis for this admission?: Yes   


Plan: 


9/5/2019-the patient is recently diagnosed with metastatic breast cancer.  She 

currently has metastatic disease to the liver and the spine.  She recently had a

Port-A-Cath put in place.  She has had to begin chemotherapy.


9/6/2019-still causing pain.  Will control pain.  Decisions about chemotherapy 

depending on her recovery from this episode.


9/7/2019-reviewed the PET scan and she has diffuse metastatic lesions in the 

skull, shoulders, ribs and spine as well as lower extremities.  There is a new 

Port-A-Cath with an intention of treatment.


9/8/2019-in trying to investigate the respiratory failure we found that the 

patient has metastatic disease in her lungs as well as her bones and liver.  

This is certainly contributing to the pleural effusions.  Her oncologist are 

from [facility] I will see her in [place] in [place].  I have asked Dr. Peraza

to see the patient one at this time to help manage her acute situation.  We will

put the patient in for thoracenteses in an effort to give her symptom relief 

quickly and then other decisions can be made non-emergently.








(4) Flash pulmonary edema


Is this a current diagnosis for this admission?: Yes   


Plan: 


9/5/2019-pulmonary edema likely secondary to the atrial fibrillation and 

possibly pneumonia.  With diuretics the patient has an approximately net 

negative fluid balance of 5 L.  As her BUN is beginning to climb we have reduce 

the intravenous fluid and I am going to decrease the furosemide to 40 mg IV 

twice daily from every 8 hours.  Continue to monitor renal function, 

electrolytes and intake and output.


9/6/2019-we continued have a negative fluid balance.  This is even with 

decreasing her furosemide slightly.  Continue current regimen.


9/7/2019-negative fluid balance of over 6 L in the past 5 days.  My x-ray the 

pulmonary edema is resolved.  Still with crackles possibly related to scarring.


9/8/2019-is now that we have the old CT scan it is clear that the effusions but 

you can have multiple etiologies including the pneumonia but more likely the 

breast cancer.  She remains on diuretic therapy and we will arrange for 

thoracenteses for both diagnostic and therapeutic reasons.  The dyspnea also 

provokes her anxiety and therefore we are utilizing morphine which helps with 

the dyspnea.








(5) Anxiety


Is this a current diagnosis for this admission?: Yes   


Plan: 


9/5/2019-in addition to antianxiety medications, small doses of morphine will 

help relax the breathing.


9/6/2019-continue current regimen.  He does seem relaxed.


9/7/2019-still with difficulty breathing.  She tends to get tachypnea.  Continue

current medications.  She may need increased doses but this potentially could 

start her breathing.


9/8/2019-as noted above the difficulty breathing triggers her anxiety.  She has 

morphine and lorazepam available.  These seem to help.








(6) Atrial fibrillation with RVR


Is this a current diagnosis for this admission?: Yes   


Plan: 


9/5/2019-the patient went into rapid atrial fibrillation.  She was placed on 

diltiazem infusion.  This failed to control or convert the patient.  

Subsequently she was given several doses of intravenous digoxin.  This in fact 

was successful in converting the patient to sinus rhythm.  We will continue the 

digoxin and add low-dose metoprolol.  Consider echocardiogram when patient is 

stable.  The patient is having hematuria and so no anticoagulation at this time.


9/6/2019-back in sinus rhythm.  Continue to monitor.


9/7/2019-she is in sinus rhythm with excellent rate control.


9/8/2019-by auscultation she appears to be in a regular rhythm.  Her rate is 

well controlled.








(7) Hematuria


Qualifiers: 


   Hematuria type: unspecified type   Qualified Code(s): R31.9 - Hematuria, 

unspecified   


Is this a current diagnosis for this admission?: Yes   


Plan: 


9/5/2019-we will discontinue anticoagulation.  We will continue to monitor 

patient's urine.  When hematuria clears consider anticoagulation although this m

ay not be necessary if the patient remains in sinus rhythm.  With an underlying 

malignancy she might be prone to hypercoagulability.  We will continue to 

monitor.


9/6/2019-holding anticoagulation.  Continue to monitor urine.


9/7/2019-hematuria is resolved.  Will resume DVT prophylaxis.


9/8/2019-resolved








- Time


Time Spent with patient: 15-24 minutes


Medications reviewed and adjusted accordingly: Yes





- Plan Summary


Plan Summary: 





Dr. Peraza is seen the patient.  It is likely we will order thoracenteses in an

effort to facilitate easier breathing.  The fluid will be sent for pathology.  

She may require Pleurx catheters or pleurodesis.  I believe he is going to 

coordinate with her primary outpatient oncologist.

## 2019-09-09 LAB
ABSOLUTE LYMPHOCYTES# (MANUAL): 0.2 10^3/UL (ref 0.5–4.7)
ABSOLUTE MONOCYTES # (MANUAL): 0.3 10^3/UL (ref 0.1–1.4)
ADD MANUAL DIFF: YES
ALBUMIN SERPL-MCNC: 2.7 G/DL (ref 3.5–5)
ALP SERPL-CCNC: 200 U/L (ref 38–126)
ANION GAP SERPL CALC-SCNC: 6 MMOL/L (ref 5–19)
ANISOCYTOSIS BLD QL SMEAR: SLIGHT
APTT BLD: 23.6 SEC (ref 23.5–35.8)
AST SERPL-CCNC: 80 U/L (ref 14–36)
BASOPHILS NFR BLD MANUAL: 0 % (ref 0–2)
BILIRUB DIRECT SERPL-MCNC: 0.3 MG/DL (ref 0–0.4)
BILIRUB SERPL-MCNC: 0.5 MG/DL (ref 0.2–1.3)
BUN SERPL-MCNC: 28 MG/DL (ref 7–20)
CALCIUM: 8.3 MG/DL (ref 8.4–10.2)
CHLORIDE SERPL-SCNC: 107 MMOL/L (ref 98–107)
CO2 SERPL-SCNC: 33 MMOL/L (ref 22–30)
EOSINOPHIL NFR BLD MANUAL: 0 % (ref 0–6)
ERYTHROCYTE [DISTWIDTH] IN BLOOD BY AUTOMATED COUNT: 15.1 % (ref 11.5–14)
GLUCOSE SERPL-MCNC: 119 MG/DL (ref 75–110)
HCT VFR BLD CALC: 41.1 % (ref 36–47)
HGB BLD-MCNC: 13.6 G/DL (ref 12–15.5)
INR PPP: 1.01
MCH RBC QN AUTO: 30.1 PG (ref 27–33.4)
MCHC RBC AUTO-ENTMCNC: 33 G/DL (ref 32–36)
MCV RBC AUTO: 91 FL (ref 80–97)
MONOCYTES % (MANUAL): 3 % (ref 3–13)
NEUTS BAND NFR BLD MANUAL: 2 % (ref 3–5)
PLATELET # BLD: 223 10^3/UL (ref 150–450)
PLATELET COMMENT: ADEQUATE
POTASSIUM SERPL-SCNC: 4 MMOL/L (ref 3.6–5)
PROT SERPL-MCNC: 5.3 G/DL (ref 6.3–8.2)
PROTHROMBIN TIME: 13.3 SEC (ref 11.4–15.4)
RBC # BLD AUTO: 4.51 10^6/UL (ref 3.72–5.28)
SEGMENTED NEUTROPHILS % (MAN): 93 % (ref 42–78)
TOTAL CELLS COUNTED BLD: 100
VANCOMYCIN,TROUGH: 6.2 UG/ML (ref 5–20)
VARIANT LYMPHS NFR BLD MANUAL: 2 % (ref 13–45)
WBC # BLD AUTO: 11.6 10^3/UL (ref 4–10.5)

## 2019-09-09 RX ADMIN — Medication SCH: at 15:30

## 2019-09-09 RX ADMIN — METOPROLOL TARTRATE SCH: 25 TABLET, FILM COATED ORAL at 22:05

## 2019-09-09 RX ADMIN — POTASSIUM CHLORIDE SCH MEQ: 750 TABLET, FILM COATED, EXTENDED RELEASE ORAL at 11:50

## 2019-09-09 RX ADMIN — POTASSIUM CHLORIDE SCH: 750 TABLET, FILM COATED, EXTENDED RELEASE ORAL at 17:19

## 2019-09-09 RX ADMIN — IPRATROPIUM BROMIDE AND ALBUTEROL SULFATE SCH ML: 2.5; .5 SOLUTION RESPIRATORY (INHALATION) at 13:23

## 2019-09-09 RX ADMIN — GABAPENTIN SCH MG: 400 CAPSULE ORAL at 11:50

## 2019-09-09 RX ADMIN — VANCOMYCIN HYDROCHLORIDE SCH MLS/HR: 1 INJECTION, POWDER, LYOPHILIZED, FOR SOLUTION INTRAVENOUS at 11:49

## 2019-09-09 RX ADMIN — IPRATROPIUM BROMIDE AND ALBUTEROL SULFATE SCH ML: 2.5; .5 SOLUTION RESPIRATORY (INHALATION) at 19:54

## 2019-09-09 RX ADMIN — MORPHINE SULFATE PRN MG: 10 INJECTION INTRAMUSCULAR; INTRAVENOUS; SUBCUTANEOUS at 09:04

## 2019-09-09 RX ADMIN — GABAPENTIN SCH MG: 400 CAPSULE ORAL at 17:19

## 2019-09-09 RX ADMIN — CELECOXIB SCH MG: 200 CAPSULE ORAL at 11:51

## 2019-09-09 RX ADMIN — Medication SCH ML: at 22:05

## 2019-09-09 RX ADMIN — METHYLPREDNISOLONE SODIUM SUCCINATE SCH MG: 125 INJECTION, POWDER, FOR SOLUTION INTRAMUSCULAR; INTRAVENOUS at 22:04

## 2019-09-09 RX ADMIN — HYDROCODONE BITARTRATE AND ACETAMINOPHEN PRN TAB: 5; 325 TABLET ORAL at 11:55

## 2019-09-09 RX ADMIN — FAMOTIDINE SCH MG: 20 TABLET, FILM COATED ORAL at 22:04

## 2019-09-09 RX ADMIN — PIPERACILLIN AND TAZOBACTAM SCH MLS/HR: 3; .375 INJECTION, POWDER, LYOPHILIZED, FOR SOLUTION INTRAVENOUS; PARENTERAL at 16:12

## 2019-09-09 RX ADMIN — SODIUM CHLORIDE PRN MLS/HR: 9 INJECTION, SOLUTION INTRAVENOUS at 16:12

## 2019-09-09 RX ADMIN — AMLODIPINE BESYLATE SCH MG: 5 TABLET ORAL at 11:50

## 2019-09-09 RX ADMIN — VANCOMYCIN HYDROCHLORIDE SCH MLS/HR: 1 INJECTION, POWDER, LYOPHILIZED, FOR SOLUTION INTRAVENOUS at 22:04

## 2019-09-09 RX ADMIN — METOPROLOL TARTRATE SCH MG: 25 TABLET, FILM COATED ORAL at 11:51

## 2019-09-09 RX ADMIN — METHYLPREDNISOLONE SODIUM SUCCINATE SCH MG: 125 INJECTION, POWDER, FOR SOLUTION INTRAMUSCULAR; INTRAVENOUS at 16:12

## 2019-09-09 RX ADMIN — MORPHINE SULFATE PRN MG: 10 INJECTION INTRAMUSCULAR; INTRAVENOUS; SUBCUTANEOUS at 22:03

## 2019-09-09 RX ADMIN — BUDESONIDE SCH MG: 0.5 SUSPENSION RESPIRATORY (INHALATION) at 19:54

## 2019-09-09 RX ADMIN — PIPERACILLIN AND TAZOBACTAM SCH MLS/HR: 3; .375 INJECTION, POWDER, LYOPHILIZED, FOR SOLUTION INTRAVENOUS; PARENTERAL at 02:38

## 2019-09-09 RX ADMIN — MORPHINE SULFATE PRN MG: 10 INJECTION INTRAMUSCULAR; INTRAVENOUS; SUBCUTANEOUS at 13:07

## 2019-09-09 RX ADMIN — PIPERACILLIN AND TAZOBACTAM SCH MLS/HR: 3; .375 INJECTION, POWDER, LYOPHILIZED, FOR SOLUTION INTRAVENOUS; PARENTERAL at 22:03

## 2019-09-09 RX ADMIN — FAMOTIDINE SCH MG: 20 TABLET, FILM COATED ORAL at 11:51

## 2019-09-09 RX ADMIN — CELECOXIB SCH MG: 200 CAPSULE ORAL at 17:19

## 2019-09-09 RX ADMIN — IPRATROPIUM BROMIDE AND ALBUTEROL SULFATE SCH ML: 2.5; .5 SOLUTION RESPIRATORY (INHALATION) at 07:34

## 2019-09-09 RX ADMIN — GABAPENTIN SCH: 400 CAPSULE ORAL at 05:42

## 2019-09-09 RX ADMIN — BUDESONIDE SCH MG: 0.5 SUSPENSION RESPIRATORY (INHALATION) at 07:34

## 2019-09-09 RX ADMIN — GUAIFENESIN SCH MG: 600 TABLET, EXTENDED RELEASE ORAL at 11:50

## 2019-09-09 RX ADMIN — GUAIFENESIN SCH MG: 600 TABLET, EXTENDED RELEASE ORAL at 22:04

## 2019-09-09 RX ADMIN — LETROZOLE SCH MG: 2.5 TABLET, FILM COATED ORAL at 11:50

## 2019-09-09 RX ADMIN — Medication SCH ML: at 05:43

## 2019-09-09 RX ADMIN — PIPERACILLIN AND TAZOBACTAM SCH MLS/HR: 3; .375 INJECTION, POWDER, LYOPHILIZED, FOR SOLUTION INTRAVENOUS; PARENTERAL at 09:03

## 2019-09-09 RX ADMIN — IPRATROPIUM BROMIDE AND ALBUTEROL SULFATE SCH ML: 2.5; .5 SOLUTION RESPIRATORY (INHALATION) at 02:06

## 2019-09-09 RX ADMIN — METHYLPREDNISOLONE SODIUM SUCCINATE SCH MG: 40 INJECTION, POWDER, FOR SOLUTION INTRAMUSCULAR; INTRAVENOUS at 05:43

## 2019-09-09 RX ADMIN — FUROSEMIDE SCH MG: 10 INJECTION, SOLUTION INTRAMUSCULAR; INTRAVENOUS at 11:51

## 2019-09-09 NOTE — RADIOLOGY REPORT (SQ)
EXAM DESCRIPTION:  U/S THORACENTESIS WITH IMAGING



COMPLETED DATE/TIME:  9/9/2019 11:32 am



REASON FOR STUDY:  metastatic breast cancer. Bilateral pl effusions



COMPARISON:  CT chest 9/7/2019



LIMITATIONS:  None.



PROCEDURE:  Procedure, risks, benefit, and alternative explained to patient who then gave written con
sent.  The posterior left chest wall was marked using ultrasound guidance.  A time-out was called for
 correct marking verification. Chest prepped and draped using sterile technique.  Local anesthesia ac
hieved using 6 ml of 1% lidocaine injection.  A 6fr Safe-T- Centesis set was introduced into the left
 pleural space.  Fluid was aspirated.  The catheter was removed and the entry site was covered with s
terile bandage.  No immediate complications noted. 800 mL of straw-colored fluid was removed from the
 left pleural space.  No testing on the fluid.

Images acquired during the procedure were stored on PACS.



FINDINGS:  ENTRY SITE: Left posterior pleural space

FLUID VOLUME: 800 mL

FLUID ANALYSIS: Clear straw-colored fluid, no testing on the fluid as per ordering physician

OTHER: Post procedure chest x-ray, dictated separately, demonstrates no left pneumothorax



IMPRESSION:  SUCCESSFUL LEFT-SIDED THORACENTESIS USING ULTRASOUND GUIDANCE.



COMMENT:  Patient medication list reviewed: Yes- Quality ID# [id number]:Eligible professional attests to doc
umenting in the medical record they obtained, updated, or reviewed the patient's current medications.




TECHNICAL DOCUMENTATION:  JOB ID: [id number]

 2011 [employer]- All Rights Reserved



Reading location - IP/workstation name: [email or web address]

## 2019-09-09 NOTE — RADIOLOGY REPORT (SQ)
EXAM DESCRIPTION:  CHEST SINGLE VIEW



COMPLETED DATE/TIME:  9/9/2019 11:34 am



REASON FOR STUDY:  Post Left Sided Thoracentesis



COMPARISON:  9/6/2019



EXAM PARAMETERS:  NUMBER OF VIEWS: One view.

TECHNIQUE: Single frontal radiographic view of the chest acquired.

RADIATION DOSE: NA

LIMITATIONS: None.



FINDINGS:  LUNGS AND PLEURA: There is improved aeration in the left lung base.  There is small left p
leural effusion.  There is increasing somewhat loculated right pleural effusion.  Chronic interstitia
l changes.

MEDIASTINUM AND HILAR STRUCTURES: No masses.  Contour normal.

HEART AND VASCULAR STRUCTURES: Heart normal in size.  Normal vasculature.

BONES: No acute findings.

HARDWARE: None in the chest.

OTHER: No other significant finding.



IMPRESSION:  There is improved aeration of the lung bases.  Small left pleural effusion.  Increasing 
right pleural effusion.  Chronic interstitial changes.  Cannot exclude mild interstitial edema.



TECHNICAL DOCUMENTATION:  JOB ID:  [id number]

 2011 [employer]- All Rights Reserved



Reading location - IP/workstation name: [email or web address]

## 2019-09-09 NOTE — RADIOLOGY REPORT (SQ)
EXAM DESCRIPTION:  CHEST SINGLE VIEW



COMPLETED DATE/TIME:  9/9/2019 12:55 pm



REASON FOR STUDY:   2HOUR Post Left sided Thoracentesis



COMPARISON:  9/6/2019



EXAM PARAMETERS:  NUMBER OF VIEWS: One view.

TECHNIQUE: Single frontal radiographic view of the chest acquired.

RADIATION DOSE: NA

LIMITATIONS: None.



FINDINGS:  LUNGS AND PLEURA: No pneumothorax.  Decrease pleural effusion on the left.  Increased locu
lated right pleural effusion.  Cannot exclude mild pulmonary edema.

MEDIASTINUM AND HILAR STRUCTURES: No masses.  Contour normal.

HEART AND VASCULAR STRUCTURES: Heart normal in size.  Normal vasculature.

BONES: No acute findings.

HARDWARE: None in the chest.

OTHER: No other significant finding.



IMPRESSION:  No pneumothorax status post thoracentesis.  Findings as described.



TECHNICAL DOCUMENTATION:  JOB ID:  [id number]

 2011 [employer]- All Rights Reserved



Reading location - IP/workstation name: [email or web address]

## 2019-09-09 NOTE — PDOC PROGRESS REPORT
Subjective


Progress Note for:: 09/09/19


Subjective:: 





The patient is fairly breathless. Alternates between high flow and the BIPAP 

unit


Just had left thoracentesis done. Unclaer how much fluyid was taken offf.


the patient does not feel appreciably better.


Reason For Visit: 


PNEUMONIA








Physical Exam


Vital Signs: 


                                        











Temp Pulse Resp BP Pulse Ox


 


 97.7 F   74   24 H  171/80 H  100 


 


 09/09/19 04:00  09/09/19 08:00  09/09/19 10:00  09/09/19 09:35  09/09/19 10:00








                                 Intake & Output











 09/08/19 09/09/19 09/10/19





 06:59 06:59 06:59


 


Intake Total 1887 1930 


 


Output Total 1545 4950 


 


Balance 342 -3020 


 


Weight 59.2 kg 57.3 kg 











General appearance: PRESENT: no acute distress, mild distress - The patient is a

bit tachypneic presently, well-developed, well-nourished


Head exam: PRESENT: atraumatic, normocephalic


Eye exam: PRESENT: conjunctiva pink, EOMI, PERRLA.  ABSENT: scleral icterus


Ear exam: PRESENT: normal external ear exam


Mouth exam: PRESENT: moist, tongue midline


Neck exam: PRESENT: full ROM.  ABSENT: carotid bruit, JVD, lymphadenopathy, 

thyromegaly


Respiratory exam: PRESENT: decreased breath sounds - Has decreased BS 

bilaterally at the bases..  ABSENT: accessory muscle use


Cardiovascular exam: PRESENT: RRR.  ABSENT: diastolic murmur, rubs, systolic 

murmur


Pulses: PRESENT: normal dorsalis pedis pul, +2 pedal pulses bilateral


Vascular exam: PRESENT: normal capillary refill


GI/Abdominal exam: PRESENT: normal bowel sounds, soft.  ABSENT: distended, 

guarding, mass, organolmegaly, rebound, tenderness


Rectal exam: PRESENT: deferred


Extremities exam: PRESENT: full ROM.  ABSENT: joint swelling, pedal edema


Neurological exam: PRESENT: alert, awake, oriented to person, oriented to place,

oriented to time, oriented to situation, CN II-XII grossly intact.  ABSENT: 

motor sensory deficit


Psychiatric exam: PRESENT: appropriate affect, normal mood.  ABSENT: homicidal 

ideation, suicidal ideation


Skin exam: PRESENT: dry, intact, warm.  ABSENT: cyanosis, rash





Results


Laboratory Results: 


                                        





                                 09/09/19 04:47 





                                 09/09/19 04:47 





                                        











  09/09/19 09/09/19





  04:47 04:47


 


WBC  11.6 H 


 


RBC  4.51 


 


Hgb  13.6 


 


Hct  41.1 


 


MCV  91 


 


MCH  30.1 


 


MCHC  33.0 


 


RDW  15.1 H 


 


Plt Count  223 


 


Seg Neutrophils %  Not Reportable 


 


Sodium   145.7 H


 


Potassium   4.0


 


Chloride   107


 


Carbon Dioxide   33 H


 


Anion Gap   6


 


BUN   28 H


 


Creatinine   0.63


 


Est GFR (African Amer)   > 60


 


Glucose   119 H


 


Calcium   8.3 L


 


Total Bilirubin   0.5


 


AST   80 H


 


Alkaline Phosphatase   200 H


 


Total Protein   5.3 L


 


Albumin   2.7 L








                                        











  09/04/19 09/07/19





  18:45 16:00


 


Troponin I  0.730  0.073











Impressions: 


                                        





Chest/Abdomen CTA  09/07/19 00:00


IMPRESSION:  1.  Negative examination for pulmonary embolism.


2. There are moderate to large bilateral pleural effusions, significantly 

increased compared to prior examination, and associated bibasilar atelectasis or

consolidation.  Trace pericardial effusion unchanged from prior.


3.  Multiple new and enlarging bilateral pulmonary nodules, for example in the 

right middle lobe measuring 6 mm (series 3, image 57) and in the left upper lobe

measuring 9 mm (series 3, image 52), consistent with metastatic disease.


4.  There is diffuse bilateral interlobular septal thickening, which may reflect

pulmonary edema or alternately lymphangitic spread of malignancy.


5.  Osseous metastatic disease including compression deformity of T12.


6.  Hepatic metastatic disease.


 














Assessment & Plan





- Diagnosis


(1) Pneumonia


Qualifiers: 


   Pneumonia type: due to unspecified organism   Laterality: left   Lung 

location: lower lobe of lung   Qualified Code(s): J18.1 - Lobar pneumonia, 

unspecified organism   


Is this a current diagnosis for this admission?: Yes   


Plan: 


The patient is on a course of empiric abx therapy with vanco and Zosyn 

presently.


ZIn addition she gets HHN .











(2) Atrial fibrillation with RVR


Is this a current diagnosis for this admission?: Yes   


Plan: 


The patient is on metotoprolol for heart rate control








(3) Metastatic breast cancer


Is this a current diagnosis for this admission?: Yes   


Plan: 


The patient aspparently has widely metastatic breast caqncer with new and 

enlarging lung nodules,m liver mets and skelewtal mets.


The patient was seen by oncology today and they are considering chemotherapy.











(4) Shortness of breath


Is this a current diagnosis for this admission?: Yes   


Plan: 


Unclear to what extent the shortness of breath is die topneumonia, pl

eurtaleffusions or her porogressive malignancy.








(5) Pleural effusion


Is this a current diagnosis for this admission?: Yes   


Plan: 


The patiemnt has bilaterla effusions Left > right and liokely malifgnannt. nThe 

left pleural effusion was tapped today. Unclear if theoppatient will requirew 

recuirrent drainage or may benefit from a pleurex catheter.

## 2019-09-09 NOTE — PDOC PROGRESS REPORT
Subjective


Progress Note for:: 09/09/19


Subjective:: 


Con't on/off BIPAP w/ high flow NC, still feels SOB, thoracentesis planned for 

today





Reason For Visit: 


PNEUMONIA








Physical Exam


Vital Signs: 


                                        











Temp Pulse Resp BP Pulse Ox


 


 97.7 F   76   25 H  164/80 H  95 


 


 09/09/19 04:00  09/09/19 07:34  09/09/19 07:34  09/09/19 04:36  09/09/19 07:34








                                 Intake & Output











 09/08/19 09/09/19 09/10/19





 06:59 06:59 06:59


 


Intake Total 1887 1930 


 


Output Total 1545 4950 


 


Balance 342 -3020 


 


Weight 59.2 kg 57.3 kg 











General appearance: PRESENT: no acute distress, well-developed, well-nourished


Head exam: PRESENT: atraumatic, normocephalic


Eye exam: PRESENT: conjunctiva pink, EOMI, PERRLA.  ABSENT: scleral icterus


Ear exam: PRESENT: normal external ear exam


Mouth exam: PRESENT: moist, tongue midline


Neck exam: ABSENT: carotid bruit, JVD, lymphadenopathy, thyromegaly


Respiratory exam: PRESENT: clear to auscultation ridge.  ABSENT: rales, rhonchi, 

wheezes


Cardiovascular exam: PRESENT: RRR.  ABSENT: diastolic murmur, rubs, systolic 

murmur


Pulses: PRESENT: normal dorsalis pedis pul


Vascular exam: PRESENT: normal capillary refill


GI/Abdominal exam: PRESENT: normal bowel sounds, soft.  ABSENT: distended, 

guarding, mass, organolmegaly, rebound, tenderness


Rectal exam: PRESENT: deferred


Extremities exam: PRESENT: full ROM.  ABSENT: calf tenderness, clubbing, pedal 

edema


Neurological exam: PRESENT: alert, awake, oriented to person, oriented to place,

oriented to time, oriented to situation, CN II-XII grossly intact.  ABSENT: 

motor sensory deficit


Psychiatric exam: PRESENT: appropriate affect, normal mood.  ABSENT: homicidal 

ideation, suicidal ideation


Skin exam: PRESENT: dry, intact, warm.  ABSENT: cyanosis, rash





Results


Laboratory Results: 


                                        





                                 09/09/19 04:47 





                                 09/09/19 04:47 





                                        











  09/09/19 09/09/19





  04:47 04:47


 


WBC  11.6 H 


 


RBC  4.51 


 


Hgb  13.6 


 


Hct  41.1 


 


MCV  91 


 


MCH  30.1 


 


MCHC  33.0 


 


RDW  15.1 H 


 


Plt Count  223 


 


Seg Neutrophils %  Not Reportable 


 


Sodium   145.7 H


 


Potassium   4.0


 


Chloride   107


 


Carbon Dioxide   33 H


 


Anion Gap   6


 


BUN   28 H


 


Creatinine   0.63


 


Est GFR (African Amer)   > 60


 


Glucose   119 H


 


Calcium   8.3 L


 


Total Bilirubin   0.5


 


AST   80 H


 


Alkaline Phosphatase   200 H


 


Total Protein   5.3 L


 


Albumin   2.7 L








                                        











  09/04/19 09/07/19





  18:45 16:00


 


Troponin I  0.730  0.073











Impressions: 


                                        





Chest X-Ray  09/06/19 10:34


IMPRESSION:  Grossly stable appearance of the chest.  Persistent diffuse 

bilateral airspace disease most marked in the lung bases.  Small effusions.


 








Chest/Abdomen CTA  09/07/19 00:00


IMPRESSION:  1.  Negative examination for pulmonary embolism.


2. There are moderate to large bilateral pleural effusions, significantly 

increased compared to prior examination, and associated bibasilar atelectasis or

consolidation.  Trace pericardial effusion unchanged from prior.


3.  Multiple new and enlarging bilateral pulmonary nodules, for example in the 

right middle lobe measuring 6 mm (series 3, image 57) and in the left upper lobe

measuring 9 mm (series 3, image 52), consistent with metastatic disease.


4.  There is diffuse bilateral interlobular septal thickening, which may reflect

pulmonary edema or alternately lymphangitic spread of malignancy.


5.  Osseous metastatic disease including compression deformity of T12.


6.  Hepatic metastatic disease.


 














Assessment & Plan





- Diagnosis


(1) Pleural effusion


Is this a current diagnosis for this admission?: Yes   


Plan: 


Thoracentesis planned for today, would send fluid for LDH, cell count, cultures,

cytology








(2) Metastatic breast cancer


Is this a current diagnosis for this admission?: Yes   


Plan: 


Pt was being planned for chemo/rx as outpt but on hold pending her current 

condition, will monitor.








(3) Acute respiratory failure with hypoxemia


Is this a current diagnosis for this admission?: Yes   


Plan: 


2nd in part to b/l pleural effusions, con't per hospitalist team

## 2019-09-10 LAB
APPEARANCE FLD: (no result)
BODY FLD TYPE: (no result)
FLUID COLOR: (no result)
FLUID SOURCE: (no result)
FLUID VISCOSITY: (no result)

## 2019-09-10 PROCEDURE — 0W993ZZ DRAINAGE OF RIGHT PLEURAL CAVITY, PERCUTANEOUS APPROACH: ICD-10-PCS | Performed by: RADIOLOGY

## 2019-09-10 RX ADMIN — AMLODIPINE BESYLATE SCH MG: 5 TABLET ORAL at 09:36

## 2019-09-10 RX ADMIN — CELECOXIB SCH MG: 200 CAPSULE ORAL at 17:02

## 2019-09-10 RX ADMIN — MORPHINE SULFATE PRN MG: 10 INJECTION INTRAMUSCULAR; INTRAVENOUS; SUBCUTANEOUS at 06:30

## 2019-09-10 RX ADMIN — BUDESONIDE SCH MG: 0.5 SUSPENSION RESPIRATORY (INHALATION) at 08:47

## 2019-09-10 RX ADMIN — Medication SCH ML: at 21:31

## 2019-09-10 RX ADMIN — Medication SCH ML: at 06:32

## 2019-09-10 RX ADMIN — PIPERACILLIN AND TAZOBACTAM SCH MLS/HR: 3; .375 INJECTION, POWDER, LYOPHILIZED, FOR SOLUTION INTRAVENOUS; PARENTERAL at 21:32

## 2019-09-10 RX ADMIN — METHYLPREDNISOLONE SODIUM SUCCINATE SCH MG: 125 INJECTION, POWDER, FOR SOLUTION INTRAMUSCULAR; INTRAVENOUS at 13:00

## 2019-09-10 RX ADMIN — METOPROLOL TARTRATE SCH MG: 25 TABLET, FILM COATED ORAL at 09:36

## 2019-09-10 RX ADMIN — IPRATROPIUM BROMIDE AND ALBUTEROL SULFATE SCH ML: 2.5; .5 SOLUTION RESPIRATORY (INHALATION) at 20:24

## 2019-09-10 RX ADMIN — METHYLPREDNISOLONE SODIUM SUCCINATE SCH MG: 125 INJECTION, POWDER, FOR SOLUTION INTRAMUSCULAR; INTRAVENOUS at 06:32

## 2019-09-10 RX ADMIN — PIPERACILLIN AND TAZOBACTAM SCH MLS/HR: 3; .375 INJECTION, POWDER, LYOPHILIZED, FOR SOLUTION INTRAVENOUS; PARENTERAL at 08:14

## 2019-09-10 RX ADMIN — VANCOMYCIN HYDROCHLORIDE SCH MLS/HR: 1 INJECTION, POWDER, LYOPHILIZED, FOR SOLUTION INTRAVENOUS at 09:36

## 2019-09-10 RX ADMIN — FUROSEMIDE SCH MG: 10 INJECTION, SOLUTION INTRAMUSCULAR; INTRAVENOUS at 09:35

## 2019-09-10 RX ADMIN — LETROZOLE SCH MG: 2.5 TABLET, FILM COATED ORAL at 09:35

## 2019-09-10 RX ADMIN — FAMOTIDINE SCH MG: 20 TABLET, FILM COATED ORAL at 21:33

## 2019-09-10 RX ADMIN — SODIUM CHLORIDE PRN MLS/HR: 9 INJECTION, SOLUTION INTRAVENOUS at 21:16

## 2019-09-10 RX ADMIN — GUAIFENESIN SCH MG: 600 TABLET, EXTENDED RELEASE ORAL at 09:36

## 2019-09-10 RX ADMIN — MORPHINE SULFATE PRN MG: 10 INJECTION INTRAMUSCULAR; INTRAVENOUS; SUBCUTANEOUS at 13:00

## 2019-09-10 RX ADMIN — CELECOXIB SCH MG: 200 CAPSULE ORAL at 09:35

## 2019-09-10 RX ADMIN — GABAPENTIN SCH: 400 CAPSULE ORAL at 01:31

## 2019-09-10 RX ADMIN — Medication SCH ML: at 13:01

## 2019-09-10 RX ADMIN — POTASSIUM CHLORIDE SCH MEQ: 750 TABLET, FILM COATED, EXTENDED RELEASE ORAL at 17:03

## 2019-09-10 RX ADMIN — IPRATROPIUM BROMIDE AND ALBUTEROL SULFATE SCH ML: 2.5; .5 SOLUTION RESPIRATORY (INHALATION) at 08:47

## 2019-09-10 RX ADMIN — GABAPENTIN SCH MG: 400 CAPSULE ORAL at 06:32

## 2019-09-10 RX ADMIN — POTASSIUM CHLORIDE SCH MEQ: 750 TABLET, FILM COATED, EXTENDED RELEASE ORAL at 09:35

## 2019-09-10 RX ADMIN — VANCOMYCIN HYDROCHLORIDE SCH MLS/HR: 1 INJECTION, POWDER, LYOPHILIZED, FOR SOLUTION INTRAVENOUS at 21:31

## 2019-09-10 RX ADMIN — GABAPENTIN SCH MG: 400 CAPSULE ORAL at 17:03

## 2019-09-10 RX ADMIN — FAMOTIDINE SCH MG: 20 TABLET, FILM COATED ORAL at 09:36

## 2019-09-10 RX ADMIN — IPRATROPIUM BROMIDE AND ALBUTEROL SULFATE SCH ML: 2.5; .5 SOLUTION RESPIRATORY (INHALATION) at 02:04

## 2019-09-10 RX ADMIN — METHYLPREDNISOLONE SODIUM SUCCINATE SCH MG: 125 INJECTION, POWDER, FOR SOLUTION INTRAMUSCULAR; INTRAVENOUS at 21:28

## 2019-09-10 RX ADMIN — IPRATROPIUM BROMIDE AND ALBUTEROL SULFATE SCH ML: 2.5; .5 SOLUTION RESPIRATORY (INHALATION) at 14:29

## 2019-09-10 RX ADMIN — PIPERACILLIN AND TAZOBACTAM SCH MLS/HR: 3; .375 INJECTION, POWDER, LYOPHILIZED, FOR SOLUTION INTRAVENOUS; PARENTERAL at 03:45

## 2019-09-10 RX ADMIN — GABAPENTIN SCH MG: 400 CAPSULE ORAL at 12:58

## 2019-09-10 RX ADMIN — PIPERACILLIN AND TAZOBACTAM SCH MLS/HR: 3; .375 INJECTION, POWDER, LYOPHILIZED, FOR SOLUTION INTRAVENOUS; PARENTERAL at 16:55

## 2019-09-10 RX ADMIN — GUAIFENESIN SCH MG: 600 TABLET, EXTENDED RELEASE ORAL at 21:28

## 2019-09-10 RX ADMIN — BUDESONIDE SCH MG: 0.5 SUSPENSION RESPIRATORY (INHALATION) at 20:24

## 2019-09-10 RX ADMIN — METOPROLOL TARTRATE SCH: 25 TABLET, FILM COATED ORAL at 22:05

## 2019-09-10 RX ADMIN — MORPHINE SULFATE PRN MG: 10 INJECTION INTRAMUSCULAR; INTRAVENOUS; SUBCUTANEOUS at 21:29

## 2019-09-10 NOTE — PDOC PROGRESS REPORT
Subjective


Progress Note for:: 09/10/19


Subjective:: 


ROBERTA MASON is a 59 year old female with a past medical history significant 

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 

admitted 9/1/19 for pneumonia.





The patient was seen on afternoon rounds following right-sided thoracentesis.  

She was found resting in bed comfortably, on nonrebreather, speaking full 

sentences without increased work of breathing.  Patient's family members do 

indicate that she did require BiPAP for most of the day today.


She reports continued shortness of breath and generalized musculoskeletal pain, 

especially to her back and bilateral shoulders.


Otherwise, she denies fever, chills, chest pain, palpitations, cough, abdominal 

pain, nausea vomiting diarrhea.


They have no questions or concerns at this time.


No concerns per nursing.


Reason For Visit: 


PNEUMONIA








Physical Exam


Vital Signs: 


                                        











Temp Pulse Resp BP Pulse Ox


 


 97.2 F   78   17   103/54 L  100 


 


 09/10/19 16:15  09/10/19 16:15  09/10/19 16:15  09/10/19 16:15  09/10/19 16:15








                                 Intake & Output











 09/09/19 09/10/19 09/11/19





 06:59 06:59 06:59


 


Intake Total 1930 1910 1520


 


Output Total 4950 1600 300


 


Balance -3020 310 1220


 


Weight 57.3 kg 58 kg 











General appearance: PRESENT: no acute distress, cooperative, thin, well-

developed


Head exam: PRESENT: atraumatic, normocephalic


Eye exam: PRESENT: conjunctiva pink, EOMI, PERRLA.  ABSENT: scleral icterus


Ear exam: PRESENT: normal external ear exam


Mouth exam: PRESENT: moist, tongue midline


Teeth exam: PRESENT: poor dentation


Neck exam: ABSENT: carotid bruit, JVD, lymphadenopathy, thyromegaly


Respiratory exam: PRESENT: crackles - Bibasilar, prolonged expiratory phas, 

symmetrical, unlabored, other - Nonrebreather.  ABSENT: rales, rhonchi, wheezes


Cardiovascular exam: PRESENT: RRR, +S1, +S2.  ABSENT: diastolic murmur, rubs, 

systolic murmur


Pulses: PRESENT: normal dorsalis pedis pul


Vascular exam: PRESENT: normal capillary refill


GI/Abdominal exam: PRESENT: normal bowel sounds, soft.  ABSENT: distended, 

guarding, mass, organolmegaly, rebound, tenderness


Rectal exam: PRESENT: deferred


Extremities exam: PRESENT: full ROM.  ABSENT: calf tenderness, clubbing, pedal 

edema


Neurological exam: PRESENT: alert, awake, oriented to person, oriented to place,

oriented to time, oriented to situation, CN II-XII grossly intact.  ABSENT: 

motor sensory deficit


Psychiatric exam: PRESENT: anxious - Understandably tearful, appropriate affect,

normal mood.  ABSENT: homicidal ideation, suicidal ideation


Skin exam: PRESENT: dry, intact, warm.  ABSENT: cyanosis, rash





Results


Laboratory Results: 


                                        





                                 09/09/19 04:47 





                                 09/09/19 04:47 





                                        











  09/10/19





  13:50


 


Fluid Type  PLEURAL


 


Fluid Source  ABDOMEN


 


Fluid Color  STRAW


 


Fluid Appearance  SLIGHTLY HAZY


 


Fluid Viscosity  SLIGHTLY VISCOUS


 


Fluid WBC  28


 


Fluid RBC  62








                                        











  09/04/19 09/07/19





  18:45 16:00


 


Troponin I  0.730  0.073











Impressions: 


                                        





Chest/Abdomen CTA  09/07/19 00:00


IMPRESSION:  1.  Negative examination for pulmonary embolism.


2. There are moderate to large bilateral pleural effusions, significantly 

increased compared to prior examination, and associated bibasilar atelectasis or

consolidation.  Trace pericardial effusion unchanged from prior.


3.  Multiple new and enlarging bilateral pulmonary nodules, for example in the 

right middle lobe measuring 6 mm (series 3, image 57) and in the left upper lobe

measuring 9 mm (series 3, image 52), consistent with metastatic disease.


4.  There is diffuse bilateral interlobular septal thickening, which may reflect

pulmonary edema or alternately lymphangitic spread of malignancy.


5.  Osseous metastatic disease including compression deformity of T12.


6.  Hepatic metastatic disease.


 








Thoracentesis Ultrasound  09/10/19 00:00


IMPRESSION:  SUCCESSFUL THORACENTESIS USING ULTRASOUND GUIDANCE.


 








Chest X-Ray  09/10/19 16:00


IMPRESSION:  Stable small right apical pneumothorax.  Cannot exclude left lower 

lobe pneumonia versus chronic changes.


 














Assessment and Plan





- Diagnosis


(1) Pneumonia


Qualifiers: 


   Pneumonia type: due to unspecified organism   Laterality: left   Lung 

location: lower lobe of lung   Qualified Code(s): J18.1 - Lobar pneumonia, 

unspecified organism   


Is this a current diagnosis for this admission?: Yes   


Plan: 


Improved; leukocytosis is trended down, patient afebrile.  


She does continue have left lower lung opacity on chest x-ray following 

thoracentesis today, however, radiologist comments that this may be chronic 

versus pneumonia.


She does continue to require high flow oxygen via nonrebreather versus BiPAP for

support.


Blood cultures negative.


Sputum culture with normal veronica.





Patient was initially treated on IV Levaquin but due to deteriorating clinical 

condition she was changed to Zosyn and vancomycin.


As the patient continues to have leukocytosis, and did have a temperature of 

96.7 oral 48 hours ago, will continue broad-spectrum antibiotics.


No prior history of MRSA, will consider discontinuing vancomycin tomorrow if she

remains clinically unchanged.








(2) Acute respiratory failure with hypoxemia


Is this a current diagnosis for this admission?: Yes   


Plan: 


Multifactorial secondary to lung metastasis, pulmonary edema, bilateral pleural 

effusions, and underlying left lower lobe pneumonia.


X-ray also suggested pulmonary fibrosis.


Now status post bilateral thoracentesis.  850 removed from the right side today,

proximately 800 removed from the left yesterday.


Patient does note decreased work of breathing, however, continues to require 

nonrebreather versus BiPAP to maintain oxygen saturations.





Discussed with Dr. Peraza today.  If the patient does not begin to demonstrate 

improvement in her oxygen requirement following thoracentesis, will need to 

discuss transition to comfort care/hospice services.  


Continue antibiotics as above.


Continue scheduled and as needed nebulizer treatments.


Continue high-dose steroids; Solu-Medrol 60 mg every 8 hours.


Continue Mucinex twice daily.


Incentive spirometer and flutter valve to bedside.











(3) Metastatic breast cancer


Is this a current diagnosis for this admission?: Yes   


Plan: 


The patient is recently diagnosed with metastatic breast cancer.  She currently 

has metastatic disease to the liver and the spine.  She recently had a 

Port-A-Cath put in place.  She has had to begin chemotherapy.





Oncology is consulted; appreciate Dr. Peraza's assistance.





Pain management:


Continue home dose Celebrex and gabapentin.


Encouraged Tylenol use.


Norco with IV morphine for breakthrough pain available.


Continues on IV Solu-Medrol; primarily for respiratory though may help with 

discomfort.


We will add lidocaine patches.


Trial heating pad.








(4) Anxiety


Is this a current diagnosis for this admission?: Yes   


Plan: 


Continue as needed Ativan and morphine.








(5) Atrial fibrillation with RVR


Is this a current diagnosis for this admission?: Yes   


Plan: 


Resolved.


Patient was noted to go into atrial fibrillation with RVR.


She was placed on a diltiazem drip.  She did receive several doses of IV digoxin

which was successful in converting the patient back to a sinus rhythm.


She remains in normal sinus on metoprolol 25 mg p.o. every 12 hours.


She is not a candidate for chronic anticoagulation secondary to hematuria and 

lung cancer with metastatic disease.








(6) Flash pulmonary edema


Is this a current diagnosis for this admission?: Yes   


Plan: 


Resolved; likely secondary to atrial fibrillation with RVR.


Continue IV furosemide 40 mg daily.


Continue supplemental oxygen and BiPAP as needed to meet saturations.








(7) Hematuria


Qualifiers: 


   Hematuria type: unspecified type   Qualified Code(s): R31.9 - Hematuria, 

unspecified   


Is this a current diagnosis for this admission?: Yes   


Plan: 


Resolved.


Have resumed DVT prophylaxis; continue to monitor closely.








- Time


Time Spent with patient: 25-34 minutes


Medications reviewed and adjusted accordingly: Yes


Anticipated discharge: Home with Homehealth - vs Home w/ hospice


Within: within 72 hours

## 2019-09-10 NOTE — PDOC PROGRESS REPORT
Subjective


Progress Note for:: 09/10/19


Subjective:: 


Pt had L thoracentesis with 800ml out, studies pending on that but malignant 

effusion suspected. She tells me this am that she does feel better after removal

of fluid. But she still required BIPAP alternative w/ venti mask O2 over last 24

hours. She seems a bit more comfortable though than when I saw her yesterday in 

ICU.





Reason For Visit: 


PNEUMONIA








Physical Exam


Vital Signs: 


                                        











Temp Pulse Resp BP Pulse Ox


 


 97.7 F   76   20   124/61   94 


 


 09/10/19 06:09  09/10/19 07:00  09/10/19 06:09  09/10/19 06:09  09/10/19 06:09








                                 Intake & Output











 09/09/19 09/10/19 09/11/19





 06:59 06:59 06:59


 


Intake Total 1930 1910 350


 


Output Total 4950 1600 


 


Balance -3020 310 350


 


Weight 57.3 kg 58 kg 











General appearance: PRESENT: no acute distress, well-developed, well-nourished


Head exam: PRESENT: atraumatic, normocephalic


Eye exam: PRESENT: conjunctiva pink, EOMI, PERRLA.  ABSENT: scleral icterus


Ear exam: PRESENT: normal external ear exam


Mouth exam: PRESENT: moist, tongue midline


Neck exam: ABSENT: carotid bruit, JVD, lymphadenopathy, thyromegaly


Respiratory exam: PRESENT: clear to auscultation ridge.  ABSENT: rales, rhonchi, 

wheezes


Cardiovascular exam: PRESENT: RRR.  ABSENT: diastolic murmur, rubs, systolic 

murmur


Pulses: PRESENT: normal dorsalis pedis pul


Vascular exam: PRESENT: normal capillary refill


GI/Abdominal exam: PRESENT: normal bowel sounds, soft.  ABSENT: distended, g

uarding, mass, organolmegaly, rebound, tenderness


Rectal exam: PRESENT: deferred


Extremities exam: PRESENT: full ROM.  ABSENT: calf tenderness, clubbing, pedal 

edema


Neurological exam: PRESENT: alert, awake, oriented to person, oriented to place,

oriented to time, oriented to situation, CN II-XII grossly intact.  ABSENT: mo

tor sensory deficit


Psychiatric exam: PRESENT: appropriate affect, normal mood.  ABSENT: homicidal 

ideation, suicidal ideation


Skin exam: PRESENT: dry, intact, warm.  ABSENT: cyanosis, rash





Results


Laboratory Results: 


                                        





                                 09/09/19 04:47 





                                 09/09/19 04:47 





                                        











  09/04/19 09/07/19





  18:45 16:00


 


Troponin I  0.730  0.073











Impressions: 


                                        





Chest/Abdomen CTA  09/07/19 00:00


IMPRESSION:  1.  Negative examination for pulmonary embolism.


2. There are moderate to large bilateral pleural effusions, significantly 

increased compared to prior examination, and associated bibasilar atelectasis or

consolidation.  Trace pericardial effusion unchanged from prior.


3.  Multiple new and enlarging bilateral pulmonary nodules, for example in the 

right middle lobe measuring 6 mm (series 3, image 57) and in the left upper lobe

measuring 9 mm (series 3, image 52), consistent with metastatic disease.


4.  There is diffuse bilateral interlobular septal thickening, which may reflect

pulmonary edema or alternately lymphangitic spread of malignancy.


5.  Osseous metastatic disease including compression deformity of T12.


6.  Hepatic metastatic disease.


 








Thoracentesis Ultrasound  09/09/19 09:00


IMPRESSION:  SUCCESSFUL LEFT-SIDED THORACENTESIS USING ULTRASOUND GUIDANCE.


 








Chest X-Ray  09/09/19 11:00


IMPRESSION:  There is improved aeration of the lung bases.  Small left pleural 

effusion.  Increasing right pleural effusion.  Chronic interstitial changes.  

Cannot exclude mild interstitial edema.


 














Assessment & Plan





- Diagnosis


(1) Pleural effusion


Is this a current diagnosis for this admission?: Yes   


Plan: 


Likely malignant, plan for tap of R pleural effusion today








(2) Metastatic breast cancer


Is this a current diagnosis for this admission?: Yes   


Plan: 


Would hope to get to more treatment as oupt but we discussed that if R 

thoracentesis does not result in improved o2 status, we may need to consider 

going towards more comfort care means.








(3) Acute respiratory failure with hypoxemia


Is this a current diagnosis for this admission?: Yes   


Plan: 


2nd to pleural effusion in some part, possibly COPD as well.

## 2019-09-10 NOTE — PROGRESS NOTE ACKNOWLEDGEMENT
Progress Note Acknowledgement


Progess Note Acknowledgement: 


I, the undersigned member of the medical staff with appropriate privileges and 

with supervisory authority over [place], a [facility] practice allied 

health professional, acknowledge that I have reviewed the progress notes entered

on this patient, and in my professional judgment believe that the assessment 

made and/or any care evidenced was appropriate

## 2019-09-10 NOTE — ADVANCED CARE
- Diagnosis


(1) Pneumonia


Diagnosis Current: Yes   





(2) Acute respiratory failure with hypoxemia


Diagnosis Current: Yes   





(3) Metastatic breast cancer


Diagnosis Current: Yes   





(4) Flash pulmonary edema


Diagnosis Current: Yes   





(5) Anxiety


Diagnosis Current: Yes   





(6) Atrial fibrillation with RVR


Diagnosis Current: Yes   





(7) Hematuria


Diagnosis Current: Yes   


Attendance: 





Approximately 10-12 family members.  Because of the size of the group I saw the 

patient at bedside with her son and daughter and the conference moved into the 

third floor waiting room.


Resuscitation Status: Do Not Resuscitate


Discussion: 


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 

only to have breast cancer several years later.  The initial breast cancer was 

in 2016.  She was treated and subsequent follow-up did not show recurrence.  

Earlier this year however she was feeling poorly and it was discovered that she 

had bony mets and liver mets.  A liver lesion was biopsied and it showed her 

breast cancer from previously.  During this hospitalization she was found to 

have pleural effusions on CT scan of her chest revealed more metastases.  On 

Sunday and Monday I have had discussions with the patient about her condition.  

On Sunday we discussed the CAT scan and I told the patient that this is not 

curable.  The best that can be help for symptom relief.  Almost a dozen family 

members are visiting today and were looking for more information.  After initial

visit at the bedside he moved all the relatives into the family conference room.

 I brought a computer on wheels as well.  I was able to show them the bone scans

from earlier this year and last year as well as the CT scans showing her large 

pleural effusions as well as multiple metastases.  The patient is mostly BiPAP 

dependent and I explained that with the fluid there and the metastatic disease 

this is minimally improving.  She can only stay on high flow nasal cannula for 

approximately 10 to 15 minutes before desaturating.  She is constantly 

exhausted.  She has lost weight.  She is quite weak.  Her prognosis is still 

grave is just a question of time.  We reviewed specific treatment options such 

as thoracentesis, she had 800 cc of fluid removed earlier today, Pleurx 

catheters and chemotherapy.  And explaind pleurodesis and what loculated fluid 

means.  There were many questions.  Her son and daughter were present as well as

multiple relatives.  We reviewed the process of comfort measures only in great 

detail.  We also reviewed her current CODE STATUS which is DO NOT INTUBATE and 

no CPR but she would like medications.  I explained that that would be useless 

practically speaking.  In total 50 minutes was spent with the family and with 

the patient and family at the bedside.  All of the questions were answered to 

their satisfaction.  I told him that no decision is required immediately however

I asked them to consider the patient's current condition and any pain and 

suffering she might be enduring with limited chance of symptom relief and no 

chance of a cure.  They are going to discuss among themselves and the patient 

and I told him that when they come to a decision, which ever it may be, to let 

us know.


Care Planning Goals: 





To decide comfort measures or ongoing symptomatic treatment for her diffusely 

metastatic disease.


Document(s) Completed: 





none


Time Spent: 50 minutes

## 2019-09-10 NOTE — RADIOLOGY REPORT (SQ)
EXAM DESCRIPTION:  U/S THORACENTESIS WITH IMAGING



COMPLETED DATE/TIME:  9/10/2019 3:37 pm



REASON FOR STUDY:  therapeutic and diagnostic, send fluid to lab



COMPARISON:  9/9/2019



RADIATION DOSE:  None.



LIMITATIONS:  None.



PROCEDURE:  Procedure, risks, benefit, and alternative explained to patient who then gave written con
sent.  The posterior right chest wall was marked using ultrasound guidance.  A time-out was called fo
r correct marking verification. Chest prepped and draped using sterile technique.  Local anesthesia a
chieved using 10 ml of 1% lidocaine injection.  A 6fr Safe-T- Centesis set was introduced into the [facility] pleural space.  Fluid was aspirated.  The catheter was removed and the entry site was covered wit
h sterile bandage.  No immediate complications noted.

Images acquired during the procedure were stored on PACS.



FINDINGS:  ENTRY SITE: Posterior right chest wall.

FLUID VOLUME: 150 mL

FLUID ANALYSIS: Clear, straw-colored.  Small amount of debris.

OTHER: Fluid sent to the lab for testing.



IMPRESSION:  SUCCESSFUL THORACENTESIS USING ULTRASOUND GUIDANCE.



COMMENT:  Patient medication list reviewed: Yes- Quality ID# [id number]:Eligible professional attests to doc
umenting in the medical record they obtained, updated, or reviewed the patient's current medications.




TECHNICAL DOCUMENTATION:  JOB ID: [id number]

 2011 [employer]- All Rights Reserved



Reading location - IP/workstation name: [email or web address]

## 2019-09-10 NOTE — RADIOLOGY REPORT (SQ)
EXAM DESCRIPTION:  CHEST SINGLE VIEW



COMPLETED DATE/TIME:  9/10/2019 2:15 pm



REASON FOR STUDY:  S/P RT THORACENTESIS



COMPARISON:  9/9/2019



NUMBER OF VIEWS:  One view.



TECHNIQUE:  Single frontal radiographic view of the chest acquired.



LIMITATIONS:  None.



FINDINGS:  LUNGS AND PLEURA: Right pleural effusion is significantly smaller in size following thorac
entesis.  Small right apical pneumothorax cannot be excluded this is less than 5%.  Aeration in the r
ight base is improved.  Left pleural effusion is grossly unchanged.  Bilateral upper lobe airspace di
sease is stable in appearance.

MEDIASTINUM AND HILAR STRUCTURES: No masses.  Contour normal.

HEART AND VASCULAR STRUCTURES: Stable.

BONES: No acute findings.

HARDWARE: [id number] is in place.

OTHER: No other significant finding.



IMPRESSION:  Questionable small right apical pneumothorax following right-sided thoracentesis.  No ot
her significant change.



TECHNICAL DOCUMENTATION:  JOB ID:  [id number]

 2011 [employer]- All Rights Reserved



Reading location - IP/workstation name: [email or web address]

## 2019-09-10 NOTE — RADIOLOGY REPORT (SQ)
EXAM DESCRIPTION:  CHEST SINGLE VIEW



COMPLETED DATE/TIME:  9/10/2019 4:15 pm



REASON FOR STUDY:  S/P RT THORACENTESIS - 2 HR FILM



COMPARISON:  9/10/2019



EXAM PARAMETERS:  NUMBER OF VIEWS: One view.

TECHNIQUE: Single frontal radiographic view of the chest acquired.

RADIATION DOSE: NA

LIMITATIONS: None.



FINDINGS:  LUNGS AND PLEURA: Once again there is the appearance of the small apical pneumothorax on t
he right.  Unchanged.  There is ill-defined opacification in the left lower lobe.

MEDIASTINUM AND HILAR STRUCTURES: No masses.  Contour normal.

HEART AND VASCULAR STRUCTURES: Heart normal in size.  Normal vasculature.

BONES: No acute findings.

HARDWARE: None in the chest.

OTHER: No other significant finding.



IMPRESSION:  Stable small right apical pneumothorax.  Cannot exclude left lower lobe pneumonia versus
 chronic changes.



TECHNICAL DOCUMENTATION:  JOB ID:  [id number]

 2011 [employer]- All Rights Reserved



Reading location - IP/workstation name: [email or web address]

## 2019-09-11 LAB
ANION GAP SERPL CALC-SCNC: 5 MMOL/L (ref 5–19)
BUN SERPL-MCNC: 23 MG/DL (ref 7–20)
CALCIUM: 8.1 MG/DL (ref 8.4–10.2)
CHLORIDE SERPL-SCNC: 104 MMOL/L (ref 98–107)
CO2 SERPL-SCNC: 31 MMOL/L (ref 22–30)
ERYTHROCYTE [DISTWIDTH] IN BLOOD BY AUTOMATED COUNT: 14.8 % (ref 11.5–14)
GLUCOSE SERPL-MCNC: 134 MG/DL (ref 75–110)
HCT VFR BLD CALC: 40.1 % (ref 36–47)
HGB BLD-MCNC: 13.1 G/DL (ref 12–15.5)
MCH RBC QN AUTO: 29.7 PG (ref 27–33.4)
MCHC RBC AUTO-ENTMCNC: 32.7 G/DL (ref 32–36)
MCV RBC AUTO: 91 FL (ref 80–97)
PLATELET # BLD: 197 10^3/UL (ref 150–450)
POTASSIUM SERPL-SCNC: 3.9 MMOL/L (ref 3.6–5)
RBC # BLD AUTO: 4.42 10^6/UL (ref 3.72–5.28)
WBC # BLD AUTO: 14.6 10^3/UL (ref 4–10.5)

## 2019-09-11 RX ADMIN — IPRATROPIUM BROMIDE AND ALBUTEROL SULFATE SCH ML: 2.5; .5 SOLUTION RESPIRATORY (INHALATION) at 16:45

## 2019-09-11 RX ADMIN — GABAPENTIN SCH: 400 CAPSULE ORAL at 00:35

## 2019-09-11 RX ADMIN — CELECOXIB SCH MG: 200 CAPSULE ORAL at 10:05

## 2019-09-11 RX ADMIN — LIDOCAINE SCH PATCH: 50 PATCH CUTANEOUS at 10:06

## 2019-09-11 RX ADMIN — METHYLPREDNISOLONE SODIUM SUCCINATE SCH MG: 125 INJECTION, POWDER, FOR SOLUTION INTRAMUSCULAR; INTRAVENOUS at 06:00

## 2019-09-11 RX ADMIN — METOPROLOL TARTRATE SCH MG: 25 TABLET, FILM COATED ORAL at 22:27

## 2019-09-11 RX ADMIN — GUAIFENESIN SCH MG: 600 TABLET, EXTENDED RELEASE ORAL at 22:27

## 2019-09-11 RX ADMIN — LETROZOLE SCH MG: 2.5 TABLET, FILM COATED ORAL at 10:05

## 2019-09-11 RX ADMIN — BUDESONIDE SCH MG: 0.5 SUSPENSION RESPIRATORY (INHALATION) at 08:13

## 2019-09-11 RX ADMIN — METOPROLOL TARTRATE SCH MG: 25 TABLET, FILM COATED ORAL at 10:04

## 2019-09-11 RX ADMIN — HYDROCODONE BITARTRATE AND ACETAMINOPHEN PRN TAB: 5; 325 TABLET ORAL at 17:56

## 2019-09-11 RX ADMIN — AMLODIPINE BESYLATE SCH MG: 5 TABLET ORAL at 10:05

## 2019-09-11 RX ADMIN — VANCOMYCIN HYDROCHLORIDE SCH MLS/HR: 1 INJECTION, POWDER, LYOPHILIZED, FOR SOLUTION INTRAVENOUS at 10:05

## 2019-09-11 RX ADMIN — FAMOTIDINE SCH MG: 20 TABLET, FILM COATED ORAL at 22:24

## 2019-09-11 RX ADMIN — MORPHINE SULFATE PRN MG: 10 INJECTION INTRAMUSCULAR; INTRAVENOUS; SUBCUTANEOUS at 05:50

## 2019-09-11 RX ADMIN — IPRATROPIUM BROMIDE AND ALBUTEROL SULFATE SCH ML: 2.5; .5 SOLUTION RESPIRATORY (INHALATION) at 08:13

## 2019-09-11 RX ADMIN — Medication SCH ML: at 05:52

## 2019-09-11 RX ADMIN — GABAPENTIN SCH MG: 400 CAPSULE ORAL at 05:52

## 2019-09-11 RX ADMIN — CELECOXIB SCH MG: 200 CAPSULE ORAL at 17:52

## 2019-09-11 RX ADMIN — IPRATROPIUM BROMIDE AND ALBUTEROL SULFATE SCH ML: 2.5; .5 SOLUTION RESPIRATORY (INHALATION) at 02:34

## 2019-09-11 RX ADMIN — POTASSIUM CHLORIDE SCH MEQ: 750 TABLET, FILM COATED, EXTENDED RELEASE ORAL at 10:05

## 2019-09-11 RX ADMIN — AMOXICILLIN AND CLAVULANATE POTASSIUM SCH TAB: 500; 125 TABLET, FILM COATED ORAL at 22:27

## 2019-09-11 RX ADMIN — GABAPENTIN SCH MG: 400 CAPSULE ORAL at 17:52

## 2019-09-11 RX ADMIN — GABAPENTIN SCH MG: 400 CAPSULE ORAL at 13:18

## 2019-09-11 RX ADMIN — POTASSIUM CHLORIDE SCH MEQ: 750 TABLET, FILM COATED, EXTENDED RELEASE ORAL at 17:52

## 2019-09-11 RX ADMIN — PIPERACILLIN AND TAZOBACTAM SCH MLS/HR: 3; .375 INJECTION, POWDER, LYOPHILIZED, FOR SOLUTION INTRAVENOUS; PARENTERAL at 03:45

## 2019-09-11 RX ADMIN — Medication SCH: at 13:14

## 2019-09-11 RX ADMIN — BUDESONIDE SCH MG: 0.5 SUSPENSION RESPIRATORY (INHALATION) at 20:00

## 2019-09-11 RX ADMIN — BACITRACIN ZINC, NEOMYCIN, POLYMYXIN B SCH APPLIC: 400; 3.5; 5 OINTMENT TOPICAL at 17:52

## 2019-09-11 RX ADMIN — FUROSEMIDE SCH MG: 10 INJECTION, SOLUTION INTRAMUSCULAR; INTRAVENOUS at 10:04

## 2019-09-11 RX ADMIN — METHYLPREDNISOLONE SODIUM SUCCINATE SCH MG: 125 INJECTION, POWDER, FOR SOLUTION INTRAMUSCULAR; INTRAVENOUS at 22:27

## 2019-09-11 RX ADMIN — GUAIFENESIN SCH MG: 600 TABLET, EXTENDED RELEASE ORAL at 10:04

## 2019-09-11 RX ADMIN — BENZONATATE PRN MG: 100 CAPSULE ORAL at 17:54

## 2019-09-11 RX ADMIN — Medication SCH ML: at 22:27

## 2019-09-11 RX ADMIN — FAMOTIDINE SCH MG: 20 TABLET, FILM COATED ORAL at 10:05

## 2019-09-11 NOTE — PDOC PROGRESS REPORT
Subjective


Progress Note for:: 09/11/19


Subjective:: 


Patient she seems much better, she is now on nasal cannula at 4 L, speaking in 

full sentences and fully aware and alert and oriented.  I had a long discussion 

about prognosis as well as next steps of care, I discussed with nursing to 

initiate physical therapy for her and she needs to be up and around.  Hopefully 

thereafter she can get to a point where she could be discharged.  Have asked 

family to contact her oncologist and make an appointment with them next week.





Reason For Visit: 


PNEUMONIA








Physical Exam


Vital Signs: 


                                        











Temp Pulse Resp BP Pulse Ox


 


 97.7 F   80   20   117/53 L  95 


 


 09/11/19 03:14  09/11/19 03:14  09/11/19 03:14  09/11/19 03:14  09/11/19 03:14








                                 Intake & Output











 09/10/19 09/11/19 09/12/19





 06:59 06:59 06:59


 


Intake Total 1910 2970 


 


Output Total 1600 900 


 


Balance 310 2070 


 


Weight 58 kg 57.4 kg 











General appearance: PRESENT: no acute distress, well-developed, well-nourished


Head exam: PRESENT: atraumatic, normocephalic


Eye exam: PRESENT: conjunctiva pink, EOMI, PERRLA.  ABSENT: scleral icterus


Ear exam: PRESENT: normal external ear exam


Mouth exam: PRESENT: moist, tongue midline


Neck exam: ABSENT: carotid bruit, JVD, lymphadenopathy, thyromegaly


Respiratory exam: PRESENT: clear to auscultation ridge.  ABSENT: rales, rhonchi, 

wheezes


Cardiovascular exam: PRESENT: RRR.  ABSENT: diastolic murmur, rubs, systolic 

murmur


Pulses: PRESENT: normal dorsalis pedis pul


Vascular exam: PRESENT: normal capillary refill


GI/Abdominal exam: PRESENT: normal bowel sounds, soft.  ABSENT: distended, 

guarding, mass, organolmegaly, rebound, tenderness


Rectal exam: PRESENT: deferred


Extremities exam: PRESENT: full ROM.  ABSENT: calf tenderness, clubbing, pedal 

edema


Neurological exam: PRESENT: alert, awake, oriented to person, oriented to place,

oriented to time, oriented to situation, CN II-XII grossly intact.  ABSENT: 

motor sensory deficit


Psychiatric exam: PRESENT: appropriate affect, normal mood.  ABSENT: homicidal 

ideation, suicidal ideation


Skin exam: PRESENT: dry, intact, warm.  ABSENT: cyanosis, rash





Results


Laboratory Results: 


                                        





                                 09/11/19 06:50 





                                 09/11/19 06:50 





                                        











  09/10/19 09/11/19 09/11/19





  13:50 06:50 06:50


 


WBC    14.6 H


 


RBC    4.42


 


Hgb    13.1


 


Hct    40.1


 


MCV    91


 


MCH    29.7


 


MCHC    32.7


 


RDW    14.8 H


 


Plt Count    197


 


Sodium   139.5 


 


Potassium   3.9 


 


Chloride   104 


 


Carbon Dioxide   31 H 


 


Anion Gap   5 


 


BUN   23 H 


 


Creatinine   0.54 


 


Est GFR ( Amer)   > 60 


 


Glucose   134 H 


 


Calcium   8.1 L 


 


Fluid Type  PLEURAL  


 


Fluid Source  ABDOMEN  


 


Fluid Color  STRAW  


 


Fluid Appearance  SLIGHTLY HAZY  


 


Fluid Viscosity  SLIGHTLY VISCOUS  


 


Fluid WBC  28  


 


Fluid RBC  62  








                                        











  09/04/19 09/07/19





  18:45 16:00


 


Troponin I  0.730  0.073











Impressions: 


                                        





Chest/Abdomen CTA  09/07/19 00:00


IMPRESSION:  1.  Negative examination for pulmonary embolism.


2. There are moderate to large bilateral pleural effusions, significantly 

increased compared to prior examination, and associated bibasilar atelectasis or

consolidation.  Trace pericardial effusion unchanged from prior.


3.  Multiple new and enlarging bilateral pulmonary nodules, for example in the 

right middle lobe measuring 6 mm (series 3, image 57) and in the left upper lobe

measuring 9 mm (series 3, image 52), consistent with metastatic disease.


4.  There is diffuse bilateral interlobular septal thickening, which may reflect

pulmonary edema or alternately lymphangitic spread of malignancy.


5.  Osseous metastatic disease including compression deformity of T12.


6.  Hepatic metastatic disease.


 








Thoracentesis Ultrasound  09/10/19 00:00


IMPRESSION:  SUCCESSFUL THORACENTESIS USING ULTRASOUND GUIDANCE.


 








Chest X-Ray  09/10/19 16:00


IMPRESSION:  Stable small right apical pneumothorax.  Cannot exclude left lower 

lobe pneumonia versus chronic changes.


 














Assessment & Plan





- Diagnosis


(1) Pleural effusion


Is this a current diagnosis for this admission?: Yes   


Plan: 


Status post right thoracentesis with only 150 mL out, but overall her lung 

status is greatly improved, she is off BiPAP and seems to be doing better.  

Cytology is pending but I do believe this is probably a malignant effusion.








(2) Metastatic breast cancer


Is this a current diagnosis for this admission?: Yes   


Plan: 


Widely metastatic disease, planning on getting to treatment as an outpatient, I 

had a maicol discussion with patient as well as family who is at bedside about 

how performance status would need to improve to be able to receive therapy.  

They seem to understand the situation fully, and are reasonable about next steps

of care.








(3) Acute respiratory failure with hypoxemia


Is this a current diagnosis for this admission?: Yes   


Plan: 


Improved, hopefully this will continue to improve

## 2019-09-11 NOTE — PDOC PROGRESS REPORT
Subjective


Progress Note for:: 09/11/19


Subjective:: 


ROBERTA MASON is a 59 year old female with a past medical history significant 

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 

admitted 9/1/19 for pneumonia.





The patient was seen on morning rounds with multiple family members present.  

She was found resting in bed comfortably, on supplemental oxygen via NC at 4 

lpm.  She is speaking full sentences without increased work of breathing and 

reports that she is feeling significantly better today.  


She denies fever, chills, chest pain, palpitations, cough, abdominal pain, 

nausea vomiting diarrhea.


They have no questions or concerns at this time; hopeful to be discharged to 

home soon.


No concerns per nursing.


Reason For Visit: 


PNEUMONIA








Physical Exam


Vital Signs: 


                                        











Temp Pulse Resp BP Pulse Ox


 


 98.5 F   88   18   124/58 L  91 L


 


 09/11/19 08:36  09/11/19 08:36  09/11/19 08:36  09/11/19 08:36  09/11/19 08:36








                                 Intake & Output











 09/10/19 09/11/19 09/12/19





 06:59 06:59 06:59


 


Intake Total 1910 2970 250


 


Output Total 1600 900 


 


Balance 310 2070 250


 


Weight 58 kg 57.4 kg 











General appearance: PRESENT: no acute distress, cooperative - Pleasant, thin, w

ell-developed, well-nourished


Head exam: PRESENT: atraumatic, normocephalic


Eye exam: PRESENT: conjunctiva pink, EOMI, PERRLA.  ABSENT: scleral icterus


Ear exam: PRESENT: normal external ear exam


Mouth exam: PRESENT: moist, tongue midline


Neck exam: ABSENT: carotid bruit, JVD, lymphadenopathy, thyromegaly


Respiratory exam: PRESENT: prolonged expiratory phas, symmetrical, unlabored, 

wheezes - Slight bibasilar expiratory wheezing, other - Supplemental oxygen via 

nasal cannula.  ABSENT: rales, rhonchi


Cardiovascular exam: PRESENT: RRR.  ABSENT: diastolic murmur, rubs, systolic 

murmur


Pulses: PRESENT: normal dorsalis pedis pul


Vascular exam: PRESENT: normal capillary refill


GI/Abdominal exam: PRESENT: normal bowel sounds, soft.  ABSENT: distended, 

guarding, mass, organolmegaly, rebound, tenderness


Rectal exam: PRESENT: deferred


Extremities exam: PRESENT: full ROM.  ABSENT: calf tenderness, clubbing, pedal 

edema


Neurological exam: PRESENT: alert, awake, oriented to person, oriented to place,

oriented to time, oriented to situation, CN II-XII grossly intact.  ABSENT: 

motor sensory deficit


Psychiatric exam: PRESENT: appropriate affect, normal mood.  ABSENT: homicidal 

ideation, suicidal ideation


Skin exam: PRESENT: dry, intact, warm.  ABSENT: cyanosis, rash





Results


Laboratory Results: 


                                        





                                 09/11/19 06:50 





                                 09/11/19 06:50 





                                        











  09/10/19 09/11/19 09/11/19





  13:50 06:50 06:50


 


WBC    14.6 H


 


RBC    4.42


 


Hgb    13.1


 


Hct    40.1


 


MCV    91


 


MCH    29.7


 


MCHC    32.7


 


RDW    14.8 H


 


Plt Count    197


 


Sodium   139.5 


 


Potassium   3.9 


 


Chloride   104 


 


Carbon Dioxide   31 H 


 


Anion Gap   5 


 


BUN   23 H 


 


Creatinine   0.54 


 


Est GFR ( Amer)   > 60 


 


Glucose   134 H 


 


Calcium   8.1 L 


 


Fluid Type  PLEURAL  


 


Fluid Source  ABDOMEN  


 


Fluid Color  STRAW  


 


Fluid Appearance  SLIGHTLY HAZY  


 


Fluid Viscosity  SLIGHTLY VISCOUS  


 


Fluid WBC  28  


 


Fluid RBC  62  








                                        











  09/04/19 09/07/19





  18:45 16:00


 


Troponin I  0.730  0.073











Impressions: 


                                        





Chest/Abdomen CTA  09/07/19 00:00


IMPRESSION:  1.  Negative examination for pulmonary embolism.


2. There are moderate to large bilateral pleural effusions, significantly 

increased compared to prior examination, and associated bibasilar atelectasis or

consolidation.  Trace pericardial effusion unchanged from prior.


3.  Multiple new and enlarging bilateral pulmonary nodules, for example in the 

right middle lobe measuring 6 mm (series 3, image 57) and in the left upper lobe

measuring 9 mm (series 3, image 52), consistent with metastatic disease.


4.  There is diffuse bilateral interlobular septal thickening, which may reflect

pulmonary edema or alternately lymphangitic spread of malignancy.


5.  Osseous metastatic disease including compression deformity of T12.


6.  Hepatic metastatic disease.


 








Thoracentesis Ultrasound  09/10/19 00:00


IMPRESSION:  SUCCESSFUL THORACENTESIS USING ULTRASOUND GUIDANCE.


 








Chest X-Ray  09/10/19 16:00


IMPRESSION:  Stable small right apical pneumothorax.  Cannot exclude left lower 

lobe pneumonia versus chronic changes.


 














Assessment and Plan





- Diagnosis


(1) Pneumonia


Qualifiers: 


   Pneumonia type: due to unspecified organism   Laterality: left   Lung 

location: lower lobe of lung   Qualified Code(s): J18.1 - Lobar pneumonia, 

unspecified organism   


Is this a current diagnosis for this admission?: Yes   


Plan: 


Improved; leukocytosis is trended down, patient afebrile.  


She does continue have left lower lung opacity on chest x-ray following thorace

ntesis today, however, radiologist comments that this may be chronic versus 

pneumonia.


She does continue to require high flow oxygen via nonrebreather versus BiPAP for

support.


Blood cultures negative.


Sputum culture with normal veronica.





Patient was initially treated on IV Levaquin but due to deteriorating clinical 

condition she was changed to Zosyn and vancomycin.


Patient does continue to have leukocytosis, although, clinically improved.  She 

has had normal temperature for greater than 48 hours.


We will discontinue IV vancomycin and Zosyn today in preparation for possible 

discharge to home.


We will start oral Augmentin for completion of antiviral course.








(2) Chronic respiratory failure with hypoxia


Is this a current diagnosis for this admission?: Yes   


Plan: 


Acute exacerbation of chronic respiratory failure has significantly improved; 

now maintaining oxygen saturations on supplemental oxygen by nasal cannula 4 

L/min.  Did not require BiPAP overnight.


Multifactorial secondary to lung metastasis, pulmonary edema, bilateral pleural 

effusions, and underlying left lower lobe pneumonia.


X-ray also suggested pulmonary fibrosis.


Now status post bilateral thoracentesis.  150 removed from the right side today,

approximately 800 removed from the left





Continue antibiotics as above.


Continue scheduled and as needed nebulizer treatments; will decrease frequency 

of scheduled treatments.


Continue weaning Solu-Medrol.


Continue Mucinex twice daily.


Incentive spirometer and flutter valve to bedside.











(3) Metastatic breast cancer


Is this a current diagnosis for this admission?: Yes   


Plan: 


The patient is recently diagnosed with metastatic breast cancer.  She currently 

has metastatic disease to the liver and the spine.  She recently had a 

Port-A-Cath put in place.  She has had to begin chemotherapy.





Oncology is consulted; appreciate Dr. Peraza's assistance.





Pain management:


Continue home dose Celebrex and gabapentin.


Encouraged Tylenol use.


Norco with IV morphine for breakthrough pain available.


Continues on IV Solu-Medrol; primarily for respiratory though may help with 

discomfort.


We will add lidocaine patches.


Trial heating pad.








(4) Anxiety


Is this a current diagnosis for this admission?: Yes   


Plan: 


Continue as needed Ativan and morphine.








(5) Atrial fibrillation with RVR


Is this a current diagnosis for this admission?: Yes   


Plan: 


Resolved.


Patient was noted to go into atrial fibrillation with RVR.


She was placed on a diltiazem drip.  She did receive several doses of IV digoxin

which was successful in converting the patient back to a sinus rhythm.


She remains in normal sinus on metoprolol 25 mg p.o. every 12 hours.


She is not a candidate for chronic anticoagulation secondary to hematuria and 

lung cancer with metastatic disease.








(6) Flash pulmonary edema


Is this a current diagnosis for this admission?: Yes   


Plan: 


Resolved; likely secondary to atrial fibrillation with RVR.


Continue IV furosemide 40 mg daily.


Continue supplemental oxygen and BiPAP as needed to meet saturations.








(7) Hematuria


Qualifiers: 


   Hematuria type: unspecified type   Qualified Code(s): R31.9 - Hematuria, 

unspecified   


Is this a current diagnosis for this admission?: Yes   


Plan: 


Resolved.


Have resumed DVT prophylaxis; continue to monitor closely.








(8) Acute respiratory failure with hypoxemia


Is this a current diagnosis for this admission?: Yes   


Plan: 


As above.








- Time


Time Spent with patient: 25-34 minutes


Medications reviewed and adjusted accordingly: Yes


Anticipated discharge: Home with Homehealth


Within: within 24 hours - If tolerates transition to oral antibiotics and 

further reduction of steroid therapy.  Discharge with home health services and 

supplemental oxygen.

## 2019-09-12 VITALS — SYSTOLIC BLOOD PRESSURE: 133 MMHG | DIASTOLIC BLOOD PRESSURE: 60 MMHG

## 2019-09-12 RX ADMIN — Medication SCH ML: at 05:20

## 2019-09-12 RX ADMIN — BUDESONIDE SCH MG: 0.5 SUSPENSION RESPIRATORY (INHALATION) at 08:29

## 2019-09-12 RX ADMIN — GABAPENTIN SCH MG: 400 CAPSULE ORAL at 13:52

## 2019-09-12 RX ADMIN — POTASSIUM CHLORIDE SCH MEQ: 750 TABLET, FILM COATED, EXTENDED RELEASE ORAL at 09:32

## 2019-09-12 RX ADMIN — BACITRACIN ZINC, NEOMYCIN, POLYMYXIN B SCH APPLIC: 400; 3.5; 5 OINTMENT TOPICAL at 09:33

## 2019-09-12 RX ADMIN — HYDROCODONE BITARTRATE AND ACETAMINOPHEN PRN TAB: 5; 325 TABLET ORAL at 07:51

## 2019-09-12 RX ADMIN — AMOXICILLIN AND CLAVULANATE POTASSIUM SCH TAB: 500; 125 TABLET, FILM COATED ORAL at 13:52

## 2019-09-12 RX ADMIN — AMOXICILLIN AND CLAVULANATE POTASSIUM SCH TAB: 500; 125 TABLET, FILM COATED ORAL at 05:20

## 2019-09-12 RX ADMIN — IPRATROPIUM BROMIDE AND ALBUTEROL SULFATE SCH ML: 2.5; .5 SOLUTION RESPIRATORY (INHALATION) at 15:47

## 2019-09-12 RX ADMIN — METHYLPREDNISOLONE SODIUM SUCCINATE SCH MG: 125 INJECTION, POWDER, FOR SOLUTION INTRAMUSCULAR; INTRAVENOUS at 09:32

## 2019-09-12 RX ADMIN — BENZONATATE PRN MG: 100 CAPSULE ORAL at 05:20

## 2019-09-12 RX ADMIN — AMLODIPINE BESYLATE SCH MG: 5 TABLET ORAL at 09:32

## 2019-09-12 RX ADMIN — Medication SCH: at 13:54

## 2019-09-12 RX ADMIN — CELECOXIB SCH MG: 200 CAPSULE ORAL at 09:32

## 2019-09-12 RX ADMIN — IPRATROPIUM BROMIDE AND ALBUTEROL SULFATE SCH: 2.5; .5 SOLUTION RESPIRATORY (INHALATION) at 01:41

## 2019-09-12 RX ADMIN — LIDOCAINE SCH PATCH: 50 PATCH CUTANEOUS at 09:34

## 2019-09-12 RX ADMIN — LETROZOLE SCH MG: 2.5 TABLET, FILM COATED ORAL at 09:33

## 2019-09-12 RX ADMIN — GUAIFENESIN SCH MG: 600 TABLET, EXTENDED RELEASE ORAL at 09:32

## 2019-09-12 RX ADMIN — METOPROLOL TARTRATE SCH MG: 25 TABLET, FILM COATED ORAL at 09:32

## 2019-09-12 RX ADMIN — FAMOTIDINE SCH MG: 20 TABLET, FILM COATED ORAL at 09:33

## 2019-09-12 RX ADMIN — IPRATROPIUM BROMIDE AND ALBUTEROL SULFATE SCH ML: 2.5; .5 SOLUTION RESPIRATORY (INHALATION) at 08:29

## 2019-09-12 RX ADMIN — GABAPENTIN SCH MG: 400 CAPSULE ORAL at 05:20

## 2019-09-12 RX ADMIN — GABAPENTIN SCH MG: 400 CAPSULE ORAL at 00:23

## 2019-09-12 NOTE — PDOC PROGRESS REPORT
Subjective


Progress Note for:: 09/12/19


Subjective:: 


Patient actually did very well yesterday, she got up with minimal assistance 

yesterday and was able to move around and has been eating well over the last 24 

hours.  Her O2 sats have been 94 to 95% on 4 L.  However at night when she was 

asleep her O2 sat did go down to 85%.





Reason For Visit: 


PNEUMONIA








Physical Exam


Vital Signs: 


                                        











Temp Pulse Resp BP Pulse Ox


 


 97.4 F   76   20   126/74 H  91 L


 


 09/12/19 03:48  09/12/19 03:48  09/12/19 03:48  09/12/19 03:48  09/12/19 03:48








                                 Intake & Output











 09/11/19 09/12/19 09/13/19





 06:59 06:59 06:59


 


Intake Total 2970 1970 


 


Output Total 900 400 


 


Balance 2070 1570 


 


Weight 57.4 kg 57.1 kg 











General appearance: PRESENT: no acute distress, well-developed, well-nourished


Head exam: PRESENT: atraumatic, normocephalic


Eye exam: PRESENT: conjunctiva pink, EOMI, PERRLA.  ABSENT: scleral icterus


Ear exam: PRESENT: normal external ear exam


Mouth exam: PRESENT: moist, tongue midline


Neck exam: ABSENT: carotid bruit, JVD, lymphadenopathy, thyromegaly


Respiratory exam: PRESENT: clear to auscultation ridge.  ABSENT: rales, rhonchi, 

wheezes


Cardiovascular exam: PRESENT: RRR.  ABSENT: diastolic murmur, rubs, systolic mur

mur


Pulses: PRESENT: normal dorsalis pedis pul


Vascular exam: PRESENT: normal capillary refill


GI/Abdominal exam: PRESENT: normal bowel sounds, soft.  ABSENT: distended, 

guarding, mass, organolmegaly, rebound, tenderness


Rectal exam: PRESENT: deferred


Extremities exam: PRESENT: full ROM.  ABSENT: calf tenderness, clubbing, pedal 

edema


Neurological exam: PRESENT: alert, awake, oriented to person, oriented to place,

oriented to time, oriented to situation, CN II-XII grossly intact.  ABSENT: 

motor sensory deficit


Psychiatric exam: PRESENT: appropriate affect, normal mood.  ABSENT: homicidal 

ideation, suicidal ideation


Skin exam: PRESENT: dry, intact, warm.  ABSENT: cyanosis, rash





Results


Laboratory Results: 


                                        





                                 09/11/19 06:50 





                                 09/11/19 06:50 





                                        











  09/04/19 09/07/19





  18:45 16:00


 


Troponin I  0.730  0.073











Impressions: 


                                        





Chest/Abdomen CTA  09/07/19 00:00


IMPRESSION:  1.  Negative examination for pulmonary embolism.


2. There are moderate to large bilateral pleural effusions, significantly 

increased compared to prior examination, and associated bibasilar atelectasis or

consolidation.  Trace pericardial effusion unchanged from prior.


3.  Multiple new and enlarging bilateral pulmonary nodules, for example in the 

right middle lobe measuring 6 mm (series 3, image 57) and in the left upper lobe

measuring 9 mm (series 3, image 52), consistent with metastatic disease.


4.  There is diffuse bilateral interlobular septal thickening, which may reflect

pulmonary edema or alternately lymphangitic spread of malignancy.


5.  Osseous metastatic disease including compression deformity of T12.


6.  Hepatic metastatic disease.


 








Thoracentesis Ultrasound  09/10/19 00:00


IMPRESSION:  SUCCESSFUL THORACENTESIS USING ULTRASOUND GUIDANCE.


 








Chest X-Ray  09/10/19 16:00


IMPRESSION:  Stable small right apical pneumothorax.  Cannot exclude left lower 

lobe pneumonia versus chronic changes.


 














Assessment & Plan





- Diagnosis


(1) Pleural effusion


Is this a current diagnosis for this admission?: Yes   


Plan: 


Improved, cytology pending








(2) Metastatic breast cancer


Is this a current diagnosis for this admission?: Yes   


Plan: 


Patient has appointment follow-up with her oncologist next week, and she will 

make a decision on treatment going forward.








(3) Acute respiratory failure with hypoxemia


Is this a current diagnosis for this admission?: Yes   


Plan: 


Improved, once home O2 in place appropriate from an oncologic standpoint for 

discharge

## 2019-09-17 NOTE — PDOC DISCHARGE SUMMARY
General





- Admit/Disc Date/PCP


Admission Date/Primary Care Provider: 


  09/01/19 14:26





  EVELINA SANCHEZ PA-C





Discharge Date: 09/12/19





- Discharge Diagnosis


(1) Pneumonia


Is this a current diagnosis for this admission?: Yes   


Summary: 


Resolved. Leukocytosis is trended down, patient afebrile, lung sounds clear, now

ambulatory on supplemental oxygen via NC.  


She does continue have left lower lung opacity on chest x-ray following 

thoracentesis, however, radiologist comments that this may be chronic versus 

pneumonia.


Blood cultures negative.


Sputum culture with normal veronica.





Patient was initially treated on IV Levaquin but due to deteriorating clinical 

condition she was changed to Zosyn and vancomycin.


Patient does continue to have leukocytosis, although, trending down, patient 

reports she is asymptomatic, and is clinically improved.  She has had normal 

temperature for greater than 48 hours.


She was transitioned to oral Augmentin for completion of antibiotic course post 

discharge.











(2) Chronic respiratory failure with hypoxia


Is this a current diagnosis for this admission?: Yes   


Summary: 


Acute exacerbation of chronic respiratory failure has resolved; now maintaining 

oxygen saturations on supplemental oxygen by nasal cannula 4 L/min.  Did not 

require BiPAP overnight.


Multifactorial secondary to lung metastasis, pulmonary edema, bilateral pleural 

effusions, and underlying left lower lobe pneumonia.


X-ray also suggested pulmonary fibrosis.


Now status post bilateral thoracentesis.  150 removed from the right side, 

approximately 800 removed from the left





Continue antibiotics as above.


Continue as needed nebulizer treatments


She is transitioned to a steroid taper to complete post discharge.


Continue Mucinex twice daily.


Continue Incentive spirometer 


Arrangements have been made for patient to receive home oxygen support.








(3) Metastatic breast cancer


Is this a current diagnosis for this admission?: Yes   


Summary: 


The patient is recently diagnosed with metastatic breast cancer.  She currently 

has metastatic disease to the liver and the spine.  She recently had a 

Port-A-Cath put in place.  She has had to begin chemotherapy.





Oncology is consulted; appreciate Dr. Peraza's assistance.


Did speak with the patient's established Oncologist, Dr. Pope, prior to 

discharge.





Pain management:


Continue home dose Celebrex and gabapentin.


Encouraged Tylenol use.


As needed Norco.


Continues prednisone taper; primarily for respiratory though may help with 

discomfort.


Daily lidocaine patches.





Patient is to follow up with Dr. Pope as scheduled next week.








(4) Anxiety


Is this a current diagnosis for this admission?: Yes   


Summary: 


Resolved; related to acute respiratory failure/distress.








(5) Atrial fibrillation with RVR


Is this a current diagnosis for this admission?: Yes   


Summary: 


Resolved.


Patient was noted to go into atrial fibrillation with RVR.


She was placed on a diltiazem drip.  She did receive several doses of IV digoxin

which was successful in converting the patient back to a sinus rhythm.


She remains in normal sinus on metoprolol 25 mg p.o. every 12 hours.


She is not a candidate for chronic anticoagulation secondary to hematuria and 

lung cancer with metastatic disease.








(6) Flash pulmonary edema


Is this a current diagnosis for this admission?: Yes   


Summary: 


Resolved; likely secondary to atrial fibrillation with RVR.


Treated with IV furosemide.








(7) Hematuria


Is this a current diagnosis for this admission?: Yes   


Summary: 


Resolved.








(8) Acute respiratory failure with hypoxemia


Is this a current diagnosis for this admission?: Yes   


Summary: 


Acute worsening has resolved.


Management as above.








- Additional Information


Resuscitation Status: Do Not Resuscitate


Discharge Activity: Activity As Tolerated, Balance Activity w/Rest, Slowly 

Increase Activity


Prescriptions: 


Amox Tr/Potassium Clavulanate [Augmentin  "500" Tablet] 1 tab PO Q8 #24 tablet


Lactobacillus Acidophilus [Bacid 250 mg Tablet] 250 mg PO BID #20 tab


Prednisone [Deltasone 20 mg Tablet] 60 mg PO DAILY #12 tablet


Ipratropium/Albuterol Sulfate [Duoneb 3 ml Ampul] 3 ml NEB RTQ8 #90 vial.neb


Lidocaine [Lidoderm 5% (700 mg) Transdermal Patch] 1 patch TP DAILY #30 

adh..patch


Metoprolol Tartrate [Lopressor 25 mg Tablet] 25 mg PO Q12 #60 tablet


Hydrocodone/Acetaminophen [Norco 5-325 mg Tablet] 1 tab PO Q4HP PRN #60


 PRN Reason: For Pain


Amlodipine Besylate [Norvasc 5 mg Tablet] 5 mg PO DAILY #30 tablet


Albuterol Sulfate [Ventolin 0.083% Neb 2.5 mg/3 mL Ampul] 2.5 mg NEB RTQ4HP PRN 

#120 vial.neb


 PRN Reason: 


Home Medications: 








Celecoxib [Celebrex 200 mg Capsule] 200 mg PO BID 09/01/19 


Diclofenac Sodium [Voltaren] 4 gm TOP QID 09/01/19 


Gabapentin [Neurontin] 800 mg PO Q6 09/01/19 


Letrozole [Femara 2.5 mg Tablet] 2.5 mg PO DAILY 09/01/19 


Zolpidem Tartrate [Ambien] 10 mg PO QHS 09/01/19 


Acetaminophen [Tylenol 325 mg Tablet] 650 mg PO Q4HP PRN  tablet 09/12/19 


Albuterol Sulfate [Ventolin 0.083% Neb 2.5 mg/3 mL Ampul] 2.5 mg NEB RTQ4HP PRN 

#120 vial.neb 09/12/19 


Amlodipine Besylate [Norvasc 5 mg Tablet] 5 mg PO DAILY #30 tablet 09/12/19 


Amox Tr/Potassium Clavulanate [Augmentin  "500" Tablet] 1 tab PO Q8 #24 tablet 

09/12/19 


Hydrocodone/Acetaminophen [Norco 5-325 mg Tablet] 1 tab PO Q4HP PRN #60 09/12/19




Ipratropium/Albuterol Sulfate [Duoneb 3 ml Ampul] 3 ml NEB RTQ8 #90 vial.neb 

09/12/19 


Lactobacillus Acidophilus [Bacid 250 mg Tablet] 250 mg PO BID #20 tab 09/12/19 


Lidocaine [Lidoderm 5% (700 mg) Transdermal Patch] 1 patch TP DAILY #30 

adh..patch 09/12/19 


Metoprolol Tartrate [Lopressor 25 mg Tablet] 25 mg PO Q12 #60 tablet 09/12/19 


Prednisone [Deltasone 20 mg Tablet] 60 mg PO DAILY #12 tablet 09/12/19 











History of Present Illness


History of Present Illness: 


ROBERTA MASON is a 59 year old female with a past medical history significant 

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 

to the emergency department with a report of 2 weeks of gradually worsening 

nonproductive cough with shortness of breath rapidly worsening over the previous

2 to 3 days.  She also reports that she has had a poor appetite and 1-2 episodes

of diarrhea daily.  She reports posttussive emesis.  She states that she has not

yet begun any chemotherapy or immune modifiers for treatment of her cancer.


Evaluation in the emergency department reveals


Tachypnea with respiratory rate in the mid 30s, hypoxia on room air, 

leukocytosis (11.1), slightly elevated LFTs but otherwise normal chemistry, UTI 

by urinalysis, and chest x-ray demonstrating a left lung base pneumonia.


She started on IV Levaquin and referred to the hospitalist service for admission

and management of the above-stated complaints and findings.





Physical Exam


Vital Signs: 


                                        











Temp Pulse Resp BP Pulse Ox


 


 97.7 F   70   18   129/70 H  91 L


 


 09/12/19 15:25  09/12/19 15:48  09/12/19 15:48  09/12/19 15:25  09/12/19 15:48











General appearance: PRESENT: no acute distress, cooperative, thin, well-

developed, well-nourished


Head exam: PRESENT: atraumatic, normocephalic


Eye exam: PRESENT: conjunctiva pink, EOMI, PERRLA.  ABSENT: scleral icterus


Ear exam: PRESENT: normal external ear exam


Mouth exam: PRESENT: moist, tongue midline


Neck exam: ABSENT: carotid bruit, JVD, lymphadenopathy, thyromegaly


Respiratory exam: PRESENT: clear to auscultation ridge, prolonged expiratory phas,

symmetrical, unlabored, other - supplemental oxygen via NC.  ABSENT: rales, 

rhonchi, wheezes


Cardiovascular exam: PRESENT: RRR.  ABSENT: diastolic murmur, rubs, systolic 

murmur


Pulses: PRESENT: normal dorsalis pedis pul


Vascular exam: PRESENT: normal capillary refill


GI/Abdominal exam: PRESENT: normal bowel sounds, soft.  ABSENT: distended, gua

rding, mass, organolmegaly, rebound, tenderness


Rectal exam: PRESENT: deferred


Extremities exam: PRESENT: full ROM.  ABSENT: calf tenderness, clubbing, pedal 

edema


Musculoskeletal exam: PRESENT: ambulatory


Neurological exam: PRESENT: alert, awake, oriented to person, oriented to place,

oriented to time, oriented to situation, CN II-XII grossly intact.  ABSENT: 

motor sensory deficit


Psychiatric exam: PRESENT: appropriate affect, normal mood.  ABSENT: homicidal 

ideation, suicidal ideation


Skin exam: PRESENT: dry, intact, warm.  ABSENT: cyanosis, rash





Results


Laboratory Results: 


                                        





                                 09/11/19 06:50 





                                 09/11/19 06:50 





                                        











  09/04/19 09/07/19





  18:45 16:00


 


Troponin I  0.730  0.073











Impressions: 


                                        





Chest/Abdomen CTA  09/07/19 00:00


IMPRESSION:  1.  Negative examination for pulmonary embolism.


2. There are moderate to large bilateral pleural effusions, significantly 

increased compared to prior examination, and associated bibasilar atelectasis or

consolidation.  Trace pericardial effusion unchanged from prior.


3.  Multiple new and enlarging bilateral pulmonary nodules, for example in the 

right middle lobe measuring 6 mm (series 3, image 57) and in the left upper lobe

measuring 9 mm (series 3, image 52), consistent with metastatic disease.


4.  There is diffuse bilateral interlobular septal thickening, which may reflect

pulmonary edema or alternately lymphangitic spread of malignancy.


5.  Osseous metastatic disease including compression deformity of T12.


6.  Hepatic metastatic disease.


 








Thoracentesis Ultrasound  09/10/19 00:00


IMPRESSION:  SUCCESSFUL THORACENTESIS USING ULTRASOUND GUIDANCE.


 








Chest X-Ray  09/10/19 16:00


IMPRESSION:  Stable small right apical pneumothorax.  Cannot exclude left lower 

lobe pneumonia versus chronic changes.


 














Qualifiers





- *


PATIENT BEING DISCHARGED WITH ANY OF THE FOLLOWING DIAGNOSIS: No





Acute Heart Failure





- **


Is this a Heart Failure Patient?: No





Plan


Discharge Plan: 





Patient is discharged home in stable condition with home [facility]ona nursing, pt/ot, 

aide, and .


Follow up with primary care provider within 1 week.


Follow up with established Oncologist as scheduled.


Take medications as prescribed.


Use oxygen continuously at 4 L/min via nasal cannula.  Continue to use the 

incentive spirometer and flutter valve.


Return to the emergency department as needed for concerning symptoms.


Time Spent: Greater than 30 Minutes